# Patient Record
Sex: MALE | Race: ASIAN | NOT HISPANIC OR LATINO | URBAN - METROPOLITAN AREA
[De-identification: names, ages, dates, MRNs, and addresses within clinical notes are randomized per-mention and may not be internally consistent; named-entity substitution may affect disease eponyms.]

---

## 2018-03-20 ENCOUNTER — OFFICE VISIT (OUTPATIENT)
Dept: INTERNAL MEDICINE | Age: 38
End: 2018-03-20

## 2018-03-20 VITALS
HEIGHT: 68 IN | DIASTOLIC BLOOD PRESSURE: 74 MMHG | HEART RATE: 80 BPM | OXYGEN SATURATION: 98 % | TEMPERATURE: 99.1 F | SYSTOLIC BLOOD PRESSURE: 116 MMHG | WEIGHT: 169 LBS | BODY MASS INDEX: 25.61 KG/M2

## 2018-03-20 DIAGNOSIS — E03.9 HYPOTHYROIDISM, UNSPECIFIED TYPE: Primary | Chronic | ICD-10-CM

## 2018-03-20 DIAGNOSIS — E55.9 VITAMIN D DEFICIENCY: ICD-10-CM

## 2018-03-20 DIAGNOSIS — R10.30 LOWER ABDOMINAL PAIN: ICD-10-CM

## 2018-03-20 DIAGNOSIS — E01.0 THYROMEGALY: ICD-10-CM

## 2018-03-20 DIAGNOSIS — E66.3 OVERWEIGHT (BMI 25.0-29.9): Chronic | ICD-10-CM

## 2018-03-20 PROCEDURE — 99204 OFFICE O/P NEW MOD 45 MIN: CPT | Performed by: INTERNAL MEDICINE

## 2018-03-20 NOTE — PROGRESS NOTES
"[facility] [place] INTERNAL MEDICINE  BRAN MARTINEZ M.D.      Brayan Martins / 37 y.o. / male  03/20/2018    VITALS:    Visit Vitals  /74   Pulse 80   Temp 99.1 °F (37.3 °C)   Ht 172.7 cm (68\")   Wt 76.7 kg (169 lb)   SpO2 98%   BMI 25.70 kg/m²       BP Readings from Last 3 Encounters:   03/20/18 116/74     Wt Readings from Last 3 Encounters:   03/20/18 76.7 kg (169 lb)      Body mass index is 25.7 kg/m².    CC: Main reason(s) for today's visit: Establish Care (moved here here [facility]); Abdominal Pain (x 3 months); and Hypothyroidism      HPI:    Patient is a 37 y.o.  [place] male who is here to establish care.  He recently moved from [place] to work for [employer] as a .  Patient has long-standing history of hypothyroidism but stopped taking his thyroid medication about 8 months ago.  Patient apparently ran out of refills on his medication and never got it refilled.  He complains of nonspecific fatigue, has difficulty losing weight.  He has been on \"keto\" diet ×8 months with modest 5 pound weight loss.  He has history of kidney stones in 2008.  Complains of nonspecific vague lower abdominal discomfort which seems to be relieved after bowel movement and which is aggravated by non-keto diet.      Patient Care Team:  Kyrie Martinez MD as PCP - General (Internal Medicine)  ____________________________________________________________________    ASSESSMENT & PLAN:    Problem List Items Addressed This Visit        High    Hypothyroidism - Primary (Chronic)    Current Assessment & Plan     Long-standing history of hypothyroidism but he stopped taking medication about 8 months ago.  Recheck labs including thyroid peroxidase antibody, ultrasound of the thyroid.  Will need to resume medication pending lab results.         Relevant Orders    Lipid Panel With / Chol / HDL Ratio    Comprehensive Metabolic Panel    TSH+Free T4    Thyroid Peroxidase Antibody    US Thyroid       Medium    Lower abdominal pain    " Current Assessment & Plan     Nonspecific lower abdominal discomfort.  Check CBC and urinalysis.  If symptoms continue consider CT of the abdomen and pelvis.  Patient expressed understanding of above plans.         Relevant Orders    Urinalysis With / Microscopic If Indicated - Urine, Clean Catch    CBC & Differential    Thyromegaly    Current Assessment & Plan     Check US thyroid            Low    Overweight (BMI 25.0-29.9) (Chronic)    Current Assessment & Plan     Recommended against keto diet at this time.  Recommended low carbohydrate high-protein diet.  Increase exercise and physical activity.  Will restart thyroid medication which should help with his weight and metabolism.         Relevant Orders    Hemoglobin A1c       Unprioritized    Vitamin D deficiency    Current Assessment & Plan     Check vitamin D level and replace as indicated.         Relevant Orders    Vitamin D 25 Hydroxy      Other Visit Diagnoses    None.          Summary/Discussion:           Return in about 3 months (around 6/20/2018) for Reassess today's problem(s).    Future Appointments  Date Time Provider Department Center   3/21/2018 8:10 AM [facility] None   6/20/2018 2:40 PM MD [facility] None       ____________________________________________________________________        REVIEW OF SYSTEMS    Review of Systems   Constitutional: Negative.         Difficulty losing weight    HENT: Negative.    Eyes: Negative.    Respiratory: Negative.         Snoring    Cardiovascular: Negative.    Gastrointestinal: Positive for abdominal pain. Negative for blood in stool, diarrhea and vomiting. Constipation: mild.   Endocrine: Negative.  Negative for cold intolerance, polydipsia and polyuria.   Genitourinary: Negative.    Musculoskeletal: Negative.    Skin: Negative.    Allergic/Immunologic: Negative.    Neurological: Negative.    Hematological: Negative.    Psychiatric/Behavioral: Negative.          PHYSICAL  EXAMINATION    Physical Exam   Constitutional: He appears well-nourished. No distress.   Centrally overweight    HENT:   Head: Normocephalic.   Right Ear: Tympanic membrane normal.   Left Ear: Tympanic membrane normal.   Mouth/Throat: Oropharynx is clear and moist. No oral lesions.   Eyes: Conjunctivae are normal. Pupils are equal, round, and reactive to light. No scleral icterus.   Neck: Neck supple. No tracheal deviation present. Thyromegaly present.   Cardiovascular: Normal rate, regular rhythm, normal heart sounds and intact distal pulses.  Exam reveals no gallop and no friction rub.    No murmur heard.  Pulmonary/Chest: Effort normal and breath sounds normal.   Abdominal: Soft. Bowel sounds are normal. He exhibits no distension and no mass. There is tenderness (mild lower abdomen). There is no rebound and no guarding. No hernia.   Central obesity   Musculoskeletal: He exhibits no edema or deformity.   Lymphadenopathy:     He has no cervical adenopathy.        Right: No supraclavicular adenopathy present.        Left: No supraclavicular adenopathy present.   Neurological: He is alert. He has normal reflexes.   Skin: Skin is intact. No rash noted. No erythema. No pallor.   No jaundice   Psychiatric: He has a normal mood and affect. His behavior is normal. Judgment and thought content normal. Cognition and memory are normal.       REVIEWED DATA:    Labs:   No results found for: NA, K, AST, ALT, BUN, CREATININE, EGFRIFNONA, EGFRIFAFRI    No results found for: GLUCOSE, HGBA1C, MICROALBUR    No results found for: LDL, HDL, TRIG, CHOLHDLRATIO    No results found for: TSH, FREET4     No results found for: WBC, HGB, PLT      Imaging:        Medical Tests:        Summary of old records / correspondence / consultant report:        Request outside records:        ______________________________________________________________________    ALLERGIES  No Known Allergies     MEDICATIONS  No current outpatient prescriptions on  file.     No current facility-administered medications for this visit.        PFSH:     The following portions of the patient's history were reviewed and updated as appropriate: Allergies / Current Medications / Past Medical History / Surgical History / Social History / Family History    PROBLEM LIST   Patient Active Problem List   Diagnosis   • Hypothyroidism   • Overweight (BMI 25.0-29.9)   • Lower abdominal pain   • Vitamin D deficiency   • Thyromegaly       PAST MEDICAL HISTORY  Past Medical History:   Diagnosis Date   • Hypothyroidism    • Kidney stone 2008    passed       SURGICAL HISTORY  Past Surgical History:   Procedure Laterality Date   • NO PAST SURGERIES         SOCIAL HISTORY  Social History     Social History   • Marital status:    • Number of children: 2     Occupational History   •  ([employer])      Social History Main Topics   • Smoking status: Never Smoker   • Smokeless tobacco: Never Used   • Alcohol use Yes      Comment: occ   • Drug use: Unknown   • Sexual activity: Yes     Partners: Female     Birth control/ protection: Surgical     Other Topics Concern   • Not on file       FAMILY HISTORY  Family History   Problem Relation Age of Onset   • Hypertension Father    • Coronary artery disease Father 50     s/p cabg   • No Known Problems Mother    • No Known Problems Sister    • No Known Problems Maternal Grandmother    • No Known Problems Maternal Grandfather    • No Known Problems Paternal Grandmother    • Hypertension Paternal Grandfather    • Coronary artery disease Paternal Aunt    • Diabetes Neg Hx    • Thyroid disease Neg Hx          **Dragon Disclaimer:   Much of this encounter note is an electronic transcription/translation of spoken language to printed text. The electronic translation of spoken language may permit erroneous, or at times, nonsensical words or phrases to be inadvertently transcribed. Although I have reviewed the note for such errors, some may still exist.

## 2018-03-23 ENCOUNTER — TELEPHONE (OUTPATIENT)
Dept: INTERNAL MEDICINE | Age: 38
End: 2018-03-23

## 2018-03-23 DIAGNOSIS — E03.9 HYPOTHYROIDISM, UNSPECIFIED TYPE: Primary | Chronic | ICD-10-CM

## 2018-03-23 LAB
25(OH)D3+25(OH)D2 SERPL-MCNC: 26.1 NG/ML (ref 30–100)
ALBUMIN SERPL-MCNC: 4.8 G/DL (ref 3.5–5.2)
ALBUMIN/GLOB SERPL: 1.8 G/DL
ALP SERPL-CCNC: 76 U/L (ref 39–117)
ALT SERPL-CCNC: 15 U/L (ref 1–41)
APPEARANCE UR: CLEAR
AST SERPL-CCNC: 17 U/L (ref 1–40)
BASOPHILS # BLD AUTO: 0.01 10*3/MM3 (ref 0–0.2)
BASOPHILS NFR BLD AUTO: 0.2 % (ref 0–1.5)
BILIRUB SERPL-MCNC: 0.9 MG/DL (ref 0.1–1.2)
BILIRUB UR QL STRIP: NEGATIVE
BUN SERPL-MCNC: 10 MG/DL (ref 6–20)
BUN/CREAT SERPL: 9.6 (ref 7–25)
CALCIUM SERPL-MCNC: 10.2 MG/DL (ref 8.6–10.5)
CHLORIDE SERPL-SCNC: 105 MMOL/L (ref 98–107)
CHOLEST SERPL-MCNC: 155 MG/DL (ref 0–200)
CHOLEST/HDLC SERPL: 3.97 {RATIO}
CO2 SERPL-SCNC: 27.8 MMOL/L (ref 22–29)
COLOR UR: YELLOW
CREAT SERPL-MCNC: 1.04 MG/DL (ref 0.76–1.27)
EOSINOPHIL # BLD AUTO: 0.09 10*3/MM3 (ref 0–0.7)
EOSINOPHIL NFR BLD AUTO: 1.9 % (ref 0.3–6.2)
ERYTHROCYTE [DISTWIDTH] IN BLOOD BY AUTOMATED COUNT: 12.8 % (ref 11.5–14.5)
GFR SERPLBLD CREATININE-BSD FMLA CKD-EPI: 80 ML/MIN/1.73
GFR SERPLBLD CREATININE-BSD FMLA CKD-EPI: 96 ML/MIN/1.73
GLOBULIN SER CALC-MCNC: 2.6 GM/DL
GLUCOSE SERPL-MCNC: 98 MG/DL (ref 65–99)
GLUCOSE UR QL: NEGATIVE
HBA1C MFR BLD: 4.97 % (ref 4.8–5.6)
HCT VFR BLD AUTO: 43.5 % (ref 40.4–52.2)
HDLC SERPL-MCNC: 39 MG/DL (ref 40–60)
HGB BLD-MCNC: 14.2 G/DL (ref 13.7–17.6)
HGB UR QL STRIP: NEGATIVE
IMM GRANULOCYTES # BLD: 0 10*3/MM3 (ref 0–0.03)
IMM GRANULOCYTES NFR BLD: 0 % (ref 0–0.5)
KETONES UR QL STRIP: NEGATIVE
LDLC SERPL CALC-MCNC: 102 MG/DL (ref 0–100)
LEUKOCYTE ESTERASE UR QL STRIP: NEGATIVE
LYMPHOCYTES # BLD AUTO: 2.01 10*3/MM3 (ref 0.9–4.8)
LYMPHOCYTES NFR BLD AUTO: 43.4 % (ref 19.6–45.3)
MCH RBC QN AUTO: 30.4 PG (ref 27–32.7)
MCHC RBC AUTO-ENTMCNC: 32.6 G/DL (ref 32.6–36.4)
MCV RBC AUTO: 93.1 FL (ref 79.8–96.2)
MONOCYTES # BLD AUTO: 0.29 10*3/MM3 (ref 0.2–1.2)
MONOCYTES NFR BLD AUTO: 6.3 % (ref 5–12)
NEUTROPHILS # BLD AUTO: 2.23 10*3/MM3 (ref 1.9–8.1)
NEUTROPHILS NFR BLD AUTO: 48.2 % (ref 42.7–76)
NITRITE UR QL STRIP: NEGATIVE
PH UR STRIP: 5.5 [PH] (ref 5–8)
PLATELET # BLD AUTO: 188 10*3/MM3 (ref 140–500)
POTASSIUM SERPL-SCNC: 4.4 MMOL/L (ref 3.5–5.2)
PROT SERPL-MCNC: 7.4 G/DL (ref 6–8.5)
PROT UR QL STRIP: NEGATIVE
RBC # BLD AUTO: 4.67 10*6/MM3 (ref 4.6–6)
SODIUM SERPL-SCNC: 145 MMOL/L (ref 136–145)
SP GR UR: 1.02 (ref 1–1.03)
T4 FREE SERPL-MCNC: 1.01 NG/DL (ref 0.93–1.7)
THYROPEROXIDASE AB SERPL-ACNC: 24 IU/ML (ref 0–34)
TRIGL SERPL-MCNC: 71 MG/DL (ref 0–150)
TSH SERPL DL<=0.005 MIU/L-ACNC: 9.39 MIU/ML (ref 0.27–4.2)
UROBILINOGEN UR STRIP-MCNC: NORMAL MG/DL
VLDLC SERPL CALC-MCNC: 14.2 MG/DL (ref 5–40)
WBC # BLD AUTO: 4.63 10*3/MM3 (ref 4.5–10.7)

## 2018-03-23 RX ORDER — LEVOTHYROXINE SODIUM 0.05 MG/1
50 TABLET ORAL DAILY
Qty: 30 TABLET | Refills: 3 | Status: SHIPPED | OUTPATIENT
Start: 2018-03-23 | End: 2018-03-26 | Stop reason: SDUPTHER

## 2018-03-23 NOTE — PROGRESS NOTES
Call patient with his test result(s) and mail the results to him if MyChart is NOT active.    Thyroid is low:  Start levothyroxine 50 mcg qAM. Recheck labs in 2 months.   Blood sugar is fine.  Cholesterol is okay overall.     (REMINDER: Update med list, maintain dx association, and update change on CURRENT ASSESSMENT/PLAN)

## 2018-03-23 NOTE — TELEPHONE ENCOUNTER
----- Message from Kyrie Ayala MD sent at 3/23/2018  9:33 AM EDT -----  Call patient with his test result(s) and mail the results to him if MyChart is NOT active.    Thyroid is low:  Start levothyroxine 50 mcg qAM. Recheck labs in 2 months.   Blood sugar is fine.  Cholesterol is okay overall.     (REMINDER: Update med list, maintain dx association, and update change on CURRENT ASSESSMENT/PLAN)

## 2018-03-23 NOTE — ASSESSMENT & PLAN NOTE
Pt to start levothyroxine 50 mcg in the AM and to recheck thyroid labs in 2 months. IVANIA per Dr Ayala

## 2018-03-26 RX ORDER — LEVOTHYROXINE SODIUM 0.05 MG/1
50 TABLET ORAL DAILY
Qty: 30 TABLET | Refills: 3 | Status: SHIPPED | OUTPATIENT
Start: 2018-03-26 | End: 2018-08-31 | Stop reason: SDUPTHER

## 2018-05-22 DIAGNOSIS — E03.9 HYPOTHYROIDISM, UNSPECIFIED TYPE: Primary | Chronic | ICD-10-CM

## 2018-05-23 LAB
T4 FREE SERPL-MCNC: 1.38 NG/DL (ref 0.93–1.7)
TSH SERPL DL<=0.005 MIU/L-ACNC: 2.51 MIU/ML (ref 0.27–4.2)

## 2018-05-24 ENCOUNTER — TELEPHONE (OUTPATIENT)
Dept: INTERNAL MEDICINE | Age: 38
End: 2018-05-24

## 2018-06-20 ENCOUNTER — OFFICE VISIT (OUTPATIENT)
Dept: INTERNAL MEDICINE | Age: 38
End: 2018-06-20

## 2018-06-20 VITALS
TEMPERATURE: 97.8 F | BODY MASS INDEX: 25.01 KG/M2 | HEART RATE: 64 BPM | OXYGEN SATURATION: 98 % | DIASTOLIC BLOOD PRESSURE: 64 MMHG | WEIGHT: 165 LBS | HEIGHT: 68 IN | SYSTOLIC BLOOD PRESSURE: 110 MMHG

## 2018-06-20 DIAGNOSIS — E01.0 THYROMEGALY: ICD-10-CM

## 2018-06-20 DIAGNOSIS — E03.9 HYPOTHYROIDISM, UNSPECIFIED TYPE: Primary | Chronic | ICD-10-CM

## 2018-06-20 PROCEDURE — 99213 OFFICE O/P EST LOW 20 MIN: CPT | Performed by: INTERNAL MEDICINE

## 2018-06-20 NOTE — PROGRESS NOTES
"[facility] [place] INTERNAL MEDICINE  BRAN MARTINEZ M.D.      Brayan Gonzalezali / 38 y.o. / male  06/20/2018      ASSESSMENT & PLAN:    Problem List Items Addressed This Visit        High    Hypothyroidism - Primary (Chronic)    Current Assessment & Plan     Recheck TSH and Free T4 end of July. Continue levothyroxine 50 mcg for now.   Recommended proceeding with US of thyroid.          Relevant Medications    levothyroxine (SYNTHROID) 50 MCG tablet    Other Relevant Orders    TSH+Free T4       Medium    Thyromegaly    Current Assessment & Plan     Did not have US done yet. May have it done in [place].          Relevant Medications    levothyroxine (SYNTHROID) 50 MCG tablet        Orders Placed This Encounter   Procedures   • TSH+Free T4     No orders of the defined types were placed in this encounter.      Summary/Discussion:      Return in about 4 months (around 10/20/2018) for Hypothyroidism.    ____________________________________________________________________    MEDICATIONS  Current Outpatient Prescriptions   Medication Sig Dispense Refill   • levothyroxine (SYNTHROID) 50 MCG tablet Take 1 tablet by mouth Daily. 30 tablet 3     No current facility-administered medications for this visit.          VITALS    Visit Vitals  /64   Pulse 64   Temp 97.8 °F (36.6 °C)   Ht 172.7 cm (67.99\")   Wt 74.8 kg (165 lb)   SpO2 98%   BMI 25.09 kg/m²       BP Readings from Last 3 Encounters:   06/20/18 110/64   03/20/18 116/74     Wt Readings from Last 3 Encounters:   06/20/18 74.8 kg (165 lb)   03/20/18 76.7 kg (169 lb)      Body mass index is 25.09 kg/m².    CC:  Main reason(s) for today's visit: Hypothyroidism      HPI:     Feels better since restarting levothyroxine. On 50 mcg daily. Abdominal discomfort has resolved.   Did not have US performed for evaluation of enlarged thyroid. Denies family history of thyroid cancer.     Patient Care Team:  Kyrie Martinez MD as PCP - General (Internal " Medicine)  ____________________________________________________________________    REVIEW OF SYSTEMS    Review of Systems  Constitutional neg  Resp neg  CV neg     PHYSICAL EXAMINATION    Physical Exam  Alert, normal thought and judgment    REVIEWED DATA:    Labs:     Lab Results   Component Value Date     03/22/2018    K 4.4 03/22/2018    AST 17 03/22/2018    ALT 15 03/22/2018    BUN 10 03/22/2018    CREATININE 1.04 03/22/2018    EGFRIFNONA 80 03/22/2018    EGFRIFAFRI 96 03/22/2018       Lab Results   Component Value Date    HGBA1C 4.97 03/22/2018       Lab Results   Component Value Date     (H) 03/22/2018    HDL 39 (L) 03/22/2018    TRIG 71 03/22/2018    CHOLHDLRATIO 3.97 03/22/2018       Lab Results   Component Value Date    TSH 2.510 05/23/2018    FREET4 1.38 05/23/2018       Lab Results   Component Value Date    WBC 4.63 03/22/2018    HGB 14.2 03/22/2018     03/22/2018        Imaging:         Medical Tests:         Summary of old records / correspondence / consultant report:         Request outside records:         ALLERGIES  No Known Allergies     PFSH:     The following portions of the patient's history were reviewed and updated as appropriate: Allergies / Current Medications / Past Medical History / Surgical History / Social History / Family History    PROBLEM LIST   Patient Active Problem List   Diagnosis   • Hypothyroidism   • Overweight (BMI 25.0-29.9)   • Vitamin D deficiency   • Thyromegaly       PAST MEDICAL HISTORY  Past Medical History:   Diagnosis Date   • Hypothyroidism    • Kidney stone 2008    passed       SURGICAL HISTORY  Past Surgical History:   Procedure Laterality Date   • NO PAST SURGERIES         SOCIAL HISTORY  Social History     Social History   • Marital status:    • Number of children: 2     Occupational History   •  ([employer])      Social History Main Topics   • Smoking status: Never Smoker   • Smokeless tobacco: Never Used   • Alcohol use Yes       Comment: occ   • Drug use: Unknown   • Sexual activity: Yes     Partners: Female     Birth control/ protection: Surgical     Other Topics Concern   • Not on file       FAMILY HISTORY  Family History   Problem Relation Age of Onset   • Hypertension Father    • Coronary artery disease Father 50        s/p cabg   • No Known Problems Mother    • No Known Problems Sister    • No Known Problems Maternal Grandmother    • No Known Problems Maternal Grandfather    • No Known Problems Paternal Grandmother    • Hypertension Paternal Grandfather    • Coronary artery disease Paternal Aunt    • Diabetes Neg Hx    • Thyroid disease Neg Hx            **Dragon Disclaimer:   Much of this encounter note is an electronic transcription/translation of spoken language to printed text. The electronic translation of spoken language may permit erroneous, or at times, nonsensical words or phrases to be inadvertently transcribed. Although I have reviewed the note for such errors, some may still exist.

## 2018-06-20 NOTE — ASSESSMENT & PLAN NOTE
Recheck TSH and Free T4 end of July. Continue levothyroxine 50 mcg for now.   Recommended proceeding with US of thyroid.

## 2018-07-30 ENCOUNTER — RESULTS ENCOUNTER (OUTPATIENT)
Dept: INTERNAL MEDICINE | Age: 38
End: 2018-07-30

## 2018-07-30 DIAGNOSIS — E03.9 HYPOTHYROIDISM, UNSPECIFIED TYPE: Chronic | ICD-10-CM

## 2018-07-30 LAB
T4 FREE SERPL-MCNC: 1.47 NG/DL (ref 0.93–1.7)
TSH SERPL DL<=0.005 MIU/L-ACNC: 2.55 MIU/ML (ref 0.27–4.2)

## 2018-08-30 ENCOUNTER — PATIENT MESSAGE (OUTPATIENT)
Dept: INTERNAL MEDICINE | Age: 38
End: 2018-08-30

## 2018-08-31 ENCOUNTER — TELEPHONE (OUTPATIENT)
Dept: INTERNAL MEDICINE | Age: 38
End: 2018-08-31

## 2018-08-31 DIAGNOSIS — E03.9 HYPOTHYROIDISM, UNSPECIFIED TYPE: Primary | Chronic | ICD-10-CM

## 2018-08-31 RX ORDER — LEVOTHYROXINE SODIUM 0.05 MG/1
50 TABLET ORAL DAILY
Qty: 90 TABLET | Refills: 1 | Status: SHIPPED | OUTPATIENT
Start: 2018-08-31 | End: 2019-04-08 | Stop reason: SDUPTHER

## 2019-03-24 DIAGNOSIS — E03.9 HYPOTHYROIDISM, UNSPECIFIED TYPE: Chronic | ICD-10-CM

## 2019-03-26 RX ORDER — LEVOTHYROXINE SODIUM 0.05 MG/1
50 TABLET ORAL DAILY
Qty: 90 TABLET | Refills: 1 | OUTPATIENT
Start: 2019-03-26

## 2019-04-05 ENCOUNTER — OFFICE VISIT (OUTPATIENT)
Dept: INTERNAL MEDICINE | Age: 39
End: 2019-04-05

## 2019-04-05 VITALS
DIASTOLIC BLOOD PRESSURE: 68 MMHG | HEART RATE: 72 BPM | HEIGHT: 68 IN | OXYGEN SATURATION: 98 % | TEMPERATURE: 98.3 F | BODY MASS INDEX: 25.61 KG/M2 | SYSTOLIC BLOOD PRESSURE: 112 MMHG | WEIGHT: 169 LBS

## 2019-04-05 DIAGNOSIS — E66.3 OVERWEIGHT (BMI 25.0-29.9): Chronic | ICD-10-CM

## 2019-04-05 DIAGNOSIS — E03.9 HYPOTHYROIDISM, UNSPECIFIED TYPE: Primary | Chronic | ICD-10-CM

## 2019-04-05 LAB
T4 FREE SERPL-MCNC: 1.34 NG/DL (ref 0.93–1.7)
TSH SERPL DL<=0.005 MIU/L-ACNC: 3.45 MIU/ML (ref 0.27–4.2)

## 2019-04-05 PROCEDURE — 99213 OFFICE O/P EST LOW 20 MIN: CPT | Performed by: INTERNAL MEDICINE

## 2019-04-05 NOTE — PROGRESS NOTES
"[facility] [place] INTERNAL MEDICINE  BRAN MARTINEZ M.D.      Brayan Eliezer / 38 y.o. / male  04/05/2019      ASSESSMENT & PLAN:    Problem List Items Addressed This Visit        High    Hypothyroidism - Primary (Chronic)    Current Assessment & Plan     Check TSH and Free T4. Titrate dose as needed.   Continue levothyroxine 50 mcg.          Relevant Medications    levothyroxine (SYNTHROID) 50 MCG tablet    Other Relevant Orders    TSH+Free T4       Low    Overweight (BMI 25.0-29.9) (Chronic)    Current Assessment & Plan     Discussed diet. Advised to get a [employer] for work to help with his core conditioning.              Orders Placed This Encounter   Procedures   • TSH+Free T4     No orders of the defined types were placed in this encounter.      Summary/Discussion:  ·     Return in about 5 months (around 9/5/2019) for Annual physical.    ____________________________________________________________________    MEDICATIONS  Current Outpatient Medications   Medication Sig Dispense Refill   • levothyroxine (SYNTHROID) 50 MCG tablet Take 1 tablet by mouth Daily. 90 tablet 1     No current facility-administered medications for this visit.           VITALS:    Visit Vitals  /68   Pulse 72   Temp 98.3 °F (36.8 °C)   Ht 172.7 cm (67.99\")   Wt 76.7 kg (169 lb)   SpO2 98%   BMI 25.70 kg/m²       BP Readings from Last 3 Encounters:   04/05/19 112/68   06/20/18 110/64   03/20/18 116/74     Wt Readings from Last 3 Encounters:   04/05/19 76.7 kg (169 lb)   06/20/18 74.8 kg (165 lb)   03/20/18 76.7 kg (169 lb)      Body mass index is 25.7 kg/m².    CC:  Main reason(s) for today's visit: Hypothyroidism      HPI:     Chronic hypothyroidism:  On stable dose of levothyroxine, weight gain  Lab Results   Component Value Date    TSH 2.550 07/30/2018    TSH 2.510 05/23/2018    FREET4 1.47 07/30/2018    FREET4 1.38 05/23/2018     Overweight. Gained 4 lbs. Complains of central weight gain. Sits for 6 hours of 8 hours at work daily.    Patient Care " Team:  Kyrie Ayala MD as PCP - General (Internal Medicine)    ____________________________________________________________________    REVIEW OF SYSTEMS    Review of Systems  Constitutional mild weight gain   Resp neg  CV neg  GI neg       PHYSICAL EXAMINATION    Physical Exam  Constitutional  No distress  Neck: No mass, thyroid nodule, thyromegaly or cervical LAD   Abdominal: mild central obesity   Psychiatric  Alert. Judgment and thought content normal. Mood normal    REVIEWED DATA:    Labs:     Lab Results   Component Value Date     03/22/2018    K 4.4 03/22/2018    AST 17 03/22/2018    ALT 15 03/22/2018    BUN 10 03/22/2018    CREATININE 1.04 03/22/2018    EGFRIFNONA 80 03/22/2018    EGFRIFAFRI 96 03/22/2018       Lab Results   Component Value Date    HGBA1C 4.97 03/22/2018       Lab Results   Component Value Date     (H) 03/22/2018    HDL 39 (L) 03/22/2018    TRIG 71 03/22/2018    CHOLHDLRATIO 3.97 03/22/2018       Lab Results   Component Value Date    TSH 2.550 07/30/2018    FREET4 1.47 07/30/2018       Lab Results   Component Value Date    WBC 4.63 03/22/2018    HGB 14.2 03/22/2018     03/22/2018       Lab Results   Component Value Date    PROTEIN Negative 03/22/2018    GLUCOSEU Negative 03/22/2018    BLOODU Negative 03/22/2018    NITRITEU Negative 03/22/2018    LEUKOCYTESUR Negative 03/22/2018       Imaging:         Medical Tests:         Summary of old records / correspondence / consultant report:         Request outside records:         ALLERGIES  No Known Allergies     PFSH:     The following portions of the patient's history were reviewed and updated as appropriate: Allergies / Current Medications / Past Medical History / Surgical History / Social History / Family History    PROBLEM LIST   Patient Active Problem List   Diagnosis   • Hypothyroidism   • Overweight (BMI 25.0-29.9)   • Vitamin D deficiency       PAST MEDICAL HISTORY  Past Medical History:   Diagnosis Date   •  Hypothyroidism    • Kidney stone 2008    passed       SURGICAL HISTORY  Past Surgical History:   Procedure Laterality Date   • NO PAST SURGERIES         SOCIAL HISTORY  Social History     Socioeconomic History   • Marital status:      Spouse name: Not on file   • Number of children: 2   • Years of education: Not on file   • Highest education level: Not on file   Occupational History   • Occupation:  ([employer])   Tobacco Use   • Smoking status: Never Smoker   • Smokeless tobacco: Never Used   Substance and Sexual Activity   • Alcohol use: Yes     Comment: occ   • Sexual activity: Yes     Partners: Female     Birth control/protection: Surgical       FAMILY HISTORY  Family History   Problem Relation Age of Onset   • Hypertension Father    • Coronary artery disease Father 50        s/p cabg   • No Known Problems Mother    • No Known Problems Sister    • No Known Problems Maternal Grandmother    • No Known Problems Maternal Grandfather    • No Known Problems Paternal Grandmother    • Hypertension Paternal Grandfather    • Coronary artery disease Paternal Aunt    • Diabetes Neg Hx    • Thyroid disease Neg Hx          **Dragon Disclaimer:   Much of this encounter note is an electronic transcription/translation of spoken language to printed text. The electronic translation of spoken language may permit erroneous, or at times, nonsensical words or phrases to be inadvertently transcribed. Although I have reviewed the note for such errors, some may still exist.       Template created by Milton Ayala MD

## 2019-04-08 DIAGNOSIS — E03.9 HYPOTHYROIDISM, UNSPECIFIED TYPE: Chronic | ICD-10-CM

## 2019-04-08 RX ORDER — LEVOTHYROXINE SODIUM 0.07 MG/1
75 TABLET ORAL DAILY
Qty: 90 TABLET | Refills: 1 | Status: SHIPPED | OUTPATIENT
Start: 2019-04-08 | End: 2019-09-11 | Stop reason: DRUGHIGH

## 2019-04-08 RX ORDER — LEVOTHYROXINE SODIUM 0.05 MG/1
75 TABLET ORAL DAILY
Qty: 90 TABLET | Refills: 1 | Status: SHIPPED | OUTPATIENT
Start: 2019-04-08 | End: 2019-04-08 | Stop reason: SDUPTHER

## 2019-04-08 NOTE — PROGRESS NOTES
Call patient with results:    Thyroid level is marginal.  Increase levothyroxine to 75 mcg.  Recheck TFT in 6-8 weeks.     (Reminder: Update Current Assessment & Plan)

## 2019-07-13 DIAGNOSIS — E03.9 HYPOTHYROIDISM, UNSPECIFIED TYPE: Chronic | ICD-10-CM

## 2019-07-15 RX ORDER — LEVOTHYROXINE SODIUM 0.05 MG/1
TABLET ORAL
Qty: 90 TABLET | Refills: 2 | Status: SHIPPED | OUTPATIENT
Start: 2019-07-15 | End: 2019-09-11 | Stop reason: SDUPTHER

## 2019-08-29 DIAGNOSIS — Z00.00 PREVENTATIVE HEALTH CARE: Primary | ICD-10-CM

## 2019-08-30 LAB
ALBUMIN SERPL-MCNC: 4.6 G/DL (ref 3.5–5.2)
ALBUMIN/GLOB SERPL: 1.7 G/DL
ALP SERPL-CCNC: 75 U/L (ref 39–117)
ALT SERPL-CCNC: 13 U/L (ref 1–41)
APPEARANCE UR: CLEAR
AST SERPL-CCNC: 15 U/L (ref 1–40)
BASOPHILS # BLD AUTO: 0.01 10*3/MM3 (ref 0–0.2)
BASOPHILS NFR BLD AUTO: 0.2 % (ref 0–1.5)
BILIRUB SERPL-MCNC: 0.8 MG/DL (ref 0.2–1.2)
BILIRUB UR QL STRIP: NEGATIVE
BUN SERPL-MCNC: 10 MG/DL (ref 6–20)
BUN/CREAT SERPL: 10.9 (ref 7–25)
CALCIUM SERPL-MCNC: 9.5 MG/DL (ref 8.6–10.5)
CHLORIDE SERPL-SCNC: 102 MMOL/L (ref 98–107)
CHOLEST SERPL-MCNC: 157 MG/DL (ref 0–200)
CHOLEST/HDLC SERPL: 4.36 {RATIO}
CO2 SERPL-SCNC: 25.8 MMOL/L (ref 22–29)
COLOR UR: YELLOW
CREAT SERPL-MCNC: 0.92 MG/DL (ref 0.76–1.27)
EOSINOPHIL # BLD AUTO: 0.08 10*3/MM3 (ref 0–0.4)
EOSINOPHIL NFR BLD AUTO: 1.8 % (ref 0.3–6.2)
ERYTHROCYTE [DISTWIDTH] IN BLOOD BY AUTOMATED COUNT: 12.4 % (ref 12.3–15.4)
GLOBULIN SER CALC-MCNC: 2.7 GM/DL
GLUCOSE SERPL-MCNC: 105 MG/DL (ref 65–99)
GLUCOSE UR QL: NEGATIVE
HCT VFR BLD AUTO: 42.5 % (ref 37.5–51)
HDLC SERPL-MCNC: 36 MG/DL (ref 40–60)
HGB BLD-MCNC: 14.1 G/DL (ref 13–17.7)
HGB UR QL STRIP: NEGATIVE
IMM GRANULOCYTES # BLD AUTO: 0.01 10*3/MM3 (ref 0–0.05)
IMM GRANULOCYTES NFR BLD AUTO: 0.2 % (ref 0–0.5)
KETONES UR QL STRIP: NEGATIVE
LDLC SERPL CALC-MCNC: 97 MG/DL (ref 0–100)
LEUKOCYTE ESTERASE UR QL STRIP: NEGATIVE
LYMPHOCYTES # BLD AUTO: 1.57 10*3/MM3 (ref 0.7–3.1)
LYMPHOCYTES NFR BLD AUTO: 35.8 % (ref 19.6–45.3)
MCH RBC QN AUTO: 30.1 PG (ref 26.6–33)
MCHC RBC AUTO-ENTMCNC: 33.2 G/DL (ref 31.5–35.7)
MCV RBC AUTO: 90.6 FL (ref 79–97)
MONOCYTES # BLD AUTO: 0.39 10*3/MM3 (ref 0.1–0.9)
MONOCYTES NFR BLD AUTO: 8.9 % (ref 5–12)
NEUTROPHILS # BLD AUTO: 2.33 10*3/MM3 (ref 1.7–7)
NEUTROPHILS NFR BLD AUTO: 53.1 % (ref 42.7–76)
NITRITE UR QL STRIP: NEGATIVE
NRBC BLD AUTO-RTO: 0.2 /100 WBC (ref 0–0.2)
PH UR STRIP: 5.5 [PH] (ref 5–8)
PLATELET # BLD AUTO: 187 10*3/MM3 (ref 140–450)
POTASSIUM SERPL-SCNC: 4.4 MMOL/L (ref 3.5–5.2)
PROT SERPL-MCNC: 7.3 G/DL (ref 6–8.5)
PROT UR QL STRIP: NEGATIVE
PSA SERPL-MCNC: 0.46 NG/ML (ref 0–4)
RBC # BLD AUTO: 4.69 10*6/MM3 (ref 4.14–5.8)
SODIUM SERPL-SCNC: 140 MMOL/L (ref 136–145)
SP GR UR: 1.02 (ref 1–1.03)
TRIGL SERPL-MCNC: 119 MG/DL (ref 0–150)
TSH SERPL DL<=0.005 MIU/L-ACNC: 2.27 UIU/ML (ref 0.27–4.2)
UROBILINOGEN UR STRIP-MCNC: NORMAL MG/DL
VLDLC SERPL CALC-MCNC: 23.8 MG/DL
WBC # BLD AUTO: 4.39 10*3/MM3 (ref 3.4–10.8)

## 2019-09-03 ENCOUNTER — TELEPHONE (OUTPATIENT)
Dept: INTERNAL MEDICINE | Age: 39
End: 2019-09-03

## 2019-09-03 NOTE — TELEPHONE ENCOUNTER
Send as [place] Message:    I see that you have an appointment with me on 9/10.   We can discuss this in detail at that time.     Dr. Ayala

## 2019-09-03 NOTE — TELEPHONE ENCOUNTER
Regarding: Test Results Question  Contact: [phone]  ----- Message from Vindicia, Generic sent at 9/3/2019 12:41 PM EDT -----    I have a question about COMPREHENSIVE METABOLIC PANEL resulted on 8/30/19, 5:07 PM.    I see blood glucose is above the range .Do I need to take preventive /corrective action     Thanks   Brayan

## 2019-09-04 NOTE — TELEPHONE ENCOUNTER
Let him know this will be discussed on the office visit.   Maintain a low sugar/starch/carbohydrate diet and exercise regularly.

## 2019-09-04 NOTE — TELEPHONE ENCOUNTER
The pt had to be moved to the next day, as this was scheduled as OV and should have been CPE.     He still wants feedback on his labs prior to CPE.    Please advise.    KD

## 2019-09-11 ENCOUNTER — OFFICE VISIT (OUTPATIENT)
Dept: INTERNAL MEDICINE | Age: 39
End: 2019-09-11

## 2019-09-11 VITALS
HEART RATE: 91 BPM | BODY MASS INDEX: 26.04 KG/M2 | WEIGHT: 171.8 LBS | TEMPERATURE: 97.3 F | DIASTOLIC BLOOD PRESSURE: 78 MMHG | HEIGHT: 68 IN | SYSTOLIC BLOOD PRESSURE: 122 MMHG | OXYGEN SATURATION: 98 %

## 2019-09-11 DIAGNOSIS — E03.9 HYPOTHYROIDISM, UNSPECIFIED TYPE: Chronic | ICD-10-CM

## 2019-09-11 DIAGNOSIS — E66.3 OVERWEIGHT (BMI 25.0-29.9): Chronic | ICD-10-CM

## 2019-09-11 DIAGNOSIS — R73.01 IFG (IMPAIRED FASTING GLUCOSE): ICD-10-CM

## 2019-09-11 DIAGNOSIS — Z00.00 ENCOUNTER FOR ANNUAL HEALTH EXAMINATION: Primary | ICD-10-CM

## 2019-09-11 LAB — HBA1C MFR BLD: 5.5 %

## 2019-09-11 PROCEDURE — 99213 OFFICE O/P EST LOW 20 MIN: CPT | Performed by: INTERNAL MEDICINE

## 2019-09-11 PROCEDURE — 83036 HEMOGLOBIN GLYCOSYLATED A1C: CPT | Performed by: INTERNAL MEDICINE

## 2019-09-11 PROCEDURE — 99396 PREV VISIT EST AGE 40-64: CPT | Performed by: INTERNAL MEDICINE

## 2019-09-11 PROCEDURE — 90715 TDAP VACCINE 7 YRS/> IM: CPT | Performed by: INTERNAL MEDICINE

## 2019-09-11 PROCEDURE — 90471 IMMUNIZATION ADMIN: CPT | Performed by: INTERNAL MEDICINE

## 2019-09-11 RX ORDER — LEVOTHYROXINE SODIUM 0.07 MG/1
75 TABLET ORAL EVERY MORNING
Qty: 30 TABLET | Refills: 3 | Status: SHIPPED | OUTPATIENT
Start: 2019-09-11 | End: 2020-01-14 | Stop reason: SDUPTHER

## 2019-09-11 NOTE — ASSESSMENT & PLAN NOTE
· The following were discussed: low calorie, low carb based diet program and make dinner lightest meal of day   · Stop eating Czech bread for supper.

## 2019-09-11 NOTE — PROGRESS NOTES
[facility] [place] [facility]  BRAN MARTINEZ M.D.      Brayan Eliezer / 39 y.o. / male  09/11/2019    CHIEF COMPLAINT     Annual Exam      HISTORY OF PRESENT ILLNESS      Reviewed chief complaint and details of complaint as documented by staff.     Brayan presents for annual health maintenance visit.  Main concern is about inability to affect weight loss with lifestyle changes and taking thyroid medication.     · Last health maintenance visit: unsure  · General health: fair  · Lifestyle:  · Attempting to lose weight?: Yes   · Diet: eats decently and eats heavier dinner  · Exercise: exercises nearly every day and sedentary at work   · Tobacco: Never used   · Alcohol: does not drink  · Work: Full-time  · Reproductive health:  · Sexually active?: Yes   · Concern for STD?: No   · Sexual problems?: No problems   · Sees Urologist?: No   · Depression Screening:      PHQ-2/PHQ-9 Depression Screening 9/11/2019   Little interest or pleasure in doing things 0   Feeling down, depressed, or hopeless 0   Total Score 0         PHQ-2: 0 (Not depressed)     PHQ-9:     Patient Care Team:  Kyrie Martinez MD as PCP - General (Internal Medicine)  ______________________________________________________________________    ALLERGIES  No Known Allergies     MEDICATIONS  Current Outpatient Medications   Medication Sig Dispense Refill   • levothyroxine (SYNTHROID, LEVOTHROID) 50 MCG tablet TAKE 1 TABLET BY MOUTH EVERY DAY 90 tablet 2     No current facility-administered medications for this visit.        PFSH:     The following portions of the patient's history were reviewed and updated as appropriate: Allergies / Current Medications / Past Medical History / Surgical History / Social History / Family History    PROBLEM LIST   Patient Active Problem List   Diagnosis   • Hypothyroidism   • Overweight (BMI 25.0-29.9)   • Vitamin D deficiency   • IFG (impaired fasting glucose)       PAST MEDICAL HISTORY  Past Medical History:   Diagnosis Date   • Hypothyroidism     • Kidney stone 2008    passed       SURGICAL HISTORY  Past Surgical History:   Procedure Laterality Date   • CIRCUMCISION  2009       SOCIAL HISTORY  Social History     Socioeconomic History   • Marital status:      Spouse name: Mikki   • Number of children: 2   • Years of education: Not on file   • Highest education level: Not on file   Occupational History   • Occupation:  ([employer])   Tobacco Use   • Smoking status: Never Smoker   • Smokeless tobacco: Never Used   Substance and Sexual Activity   • Alcohol use: Yes     Comment: occ   • Drug use: No   • Sexual activity: Yes     Partners: Female     Birth control/protection: Surgical   Lifestyle   • Physical activity:     Days per week: 7 days     Minutes per session: Not on file   • Stress: To some extent       FAMILY HISTORY  Family History   Problem Relation Age of Onset   • Hypertension Father    • Coronary artery disease Father 50        s/p cabg   • No Known Problems Mother    • No Known Problems Sister    • No Known Problems Maternal Grandmother    • No Known Problems Maternal Grandfather    • No Known Problems Paternal Grandmother    • Hypertension Paternal Grandfather    • Coronary artery disease Paternal Aunt    • Diabetes Neg Hx    • Thyroid disease Neg Hx    • Cancer Neg Hx        IMMUNIZATION HISTORY  Immunization History   Administered Date(s) Administered   • Influenza, Unspecified 02/24/2019   • Tdap 09/11/2019       ______________________________________________________________________    REVIEW OF SYSTEMS     Review of Systems   Constitutional: Positive for fatigue. Negative for unexpected weight change.   HENT: Negative.    Eyes: Negative.    Respiratory: Negative.    Cardiovascular: Negative.    Gastrointestinal: Negative.    Endocrine: Negative.    Genitourinary: Negative.    Musculoskeletal: Negative.    Skin: Negative.    Allergic/Immunologic: Negative.    Neurological: Negative.    Hematological: Negative.   "  Psychiatric/Behavioral: Negative.          VITALS     Visit Vitals  /78   Pulse 91   Temp 97.3 °F (36.3 °C)   Ht 172.7 cm (67.99\")   Wt 77.9 kg (171 lb 12.8 oz)   SpO2 98%   BMI 26.13 kg/m²       BP Readings from Last 3 Encounters:   09/11/19 122/78   04/05/19 112/68   06/20/18 110/64     Wt Readings from Last 3 Encounters:   09/11/19 77.9 kg (171 lb 12.8 oz)   04/05/19 76.7 kg (169 lb)   06/20/18 74.8 kg (165 lb)      Body mass index is 26.13 kg/m².    PHYSICAL EXAMINATION     Physical Exam   Constitutional: He is oriented to person, place, and time. He appears well-developed and well-nourished. No distress.   HENT:   Head: Normocephalic and atraumatic.   Right Ear: External ear normal.   Left Ear: External ear normal.   Nose: Nose normal.   Mouth/Throat: Oropharynx is clear and moist.   Eyes: Conjunctivae and EOM are normal. Pupils are equal, round, and reactive to light. No scleral icterus.   Neck: Normal range of motion. Neck supple. No tracheal deviation present. No thyroid mass and no thyromegaly present.   Cardiovascular: Normal rate, regular rhythm, normal heart sounds and intact distal pulses.   Pulmonary/Chest: Effort normal and breath sounds normal.   Abdominal: Soft. Normal appearance and bowel sounds are normal. He exhibits no distension and no mass. There is no hepatosplenomegaly. There is no tenderness. No hernia.   Musculoskeletal: Normal range of motion.   Lymphadenopathy:     He has no cervical adenopathy.     He has no axillary adenopathy.        Right: No inguinal and no supraclavicular adenopathy present.        Left: No inguinal and no supraclavicular adenopathy present.   Neurological: He is alert and oriented to person, place, and time. He has normal reflexes. No cranial nerve deficit. He exhibits normal muscle tone.   Skin: Skin is warm. No lesion (Negative for suspicious skin lesions/growths) and no rash noted. No pallor.   No jaundice  No suspicious skin lesions.    Psychiatric: He " has a normal mood and affect. His behavior is normal. Judgment and thought content normal.         REVIEWED DATA      Labs:    Lab Results   Component Value Date     08/30/2019    K 4.4 08/30/2019    CALCIUM 9.5 08/30/2019    AST 15 08/30/2019    ALT 13 08/30/2019    BUN 10 08/30/2019    CREATININE 0.92 08/30/2019    CREATININE 1.04 03/22/2018    EGFRIFNONA 91 08/30/2019    EGFRIFAFRI 110 08/30/2019       Lab Results   Component Value Date     (H) 08/30/2019    HGBA1C 5.5 09/11/2019    HGBA1C 4.97 03/22/2018    TSH 2.270 08/30/2019    FREET4 1.34 04/05/2019       Lab Results   Component Value Date    PSA 0.459 08/30/2019       Lab Results   Component Value Date    LDL 97 08/30/2019    HDL 36 (L) 08/30/2019    TRIG 119 08/30/2019    CHOLHDLRATIO 4.36 08/30/2019       No components found for: [id number]    Lab Results   Component Value Date    WBC 4.39 08/30/2019    HGB 14.1 08/30/2019    MCV 90.6 08/30/2019     08/30/2019       Lab Results   Component Value Date    PROTEIN Negative 08/30/2019    GLUCOSEU Negative 08/30/2019    BLOODU Negative 08/30/2019    NITRITEU Negative 08/30/2019    LEUKOCYTESUR Negative 08/30/2019        No results found for: HEPCVIRUSABY    Imaging:           Medical Tests:       ______________________________________________________________________    ASSESSMENT & PLAN     ANNUAL WELLNESS EXAM / PHYSICAL     Other medical problems addressed today:  Problem List Items Addressed This Visit        High    Hypothyroidism (Chronic)    Current Assessment & Plan     Increase levothyroxine to 75 mcg. Recheck TSH and Free T4 on follow-up.          Relevant Medications    levothyroxine (SYNTHROID, LEVOTHROID) 75 MCG tablet       Low    Overweight (BMI 25.0-29.9) (Chronic)    Current Assessment & Plan     · The following were discussed: low calorie, low carb based diet program and make dinner lightest meal of day   · Stop eating  bread for supper.             IFG (impaired  fasting glucose)    Current Assessment & Plan     This is a new problem to this examiner.   Fasting sugar 105. A1c is little higher.   Maintain a low sugar/starch/carbohydrate diet.     Lab Results   Component Value Date    HGBA1C 5.5 09/11/2019    HGBA1C 4.97 03/22/2018               Relevant Orders    POC Glycosylated Hemoglobin (Hb A1C) (Completed)      Other Visit Diagnoses     Encounter for annual health examination    -  Primary    Relevant Orders    Tdap Vaccine Greater Than or Equal To 6yo IM (Completed)          Summary/Discussion:     · Primary reason for today's visit was for annual health examination. However above active medical issues also needed to be addressed today.      Next Appointment with me: 1/13/2020    Return in about 4 months (around 1/11/2020) for Hypothyroidism and wt concerns.      HEALTHCARE MAINTENANCE ISSUES       Cancer Screening:  · Colon: Initial/Next screening at age: 45  · Repeat colon cancer screening: N/A at this time  · Prostate: PSA checked annually but defer AICHA until 50  · Testicular: Recommended monthly self exam  · Skin: Monthly self skin examination, annual exam by health professional  · Lung: Does not meet criteria for lung cancer screening.   · Other:    Screening Labs & Tests:  · Lab results reviewed & discussed with with patient or orders placed today.  · EKG:  · CV Screening: No special screening needed  · DEXA (75+ or risk factors):   · HEP C (If born [id number] or risk factors): Not indicated    Immunization/Vaccinations (to be given today unless deferred by patient)  · Influenza: Patient plans to get the flu vaccine at pharmacy/work/outside facility  · Hepatitis A: Verify immunization records  · Tetanus/Pertussis: Administer today  · Pneumovax: Not needed at this time  · Shingles: Not needed at this time  · Other:     Lifestyle Counseling:  · Lifestyle Modifications: Attempt to lose weight, Increase intensity/regularity of aerobic exercise, Maintain a low  sugar/carbohydrate diet and Manage stress/anxiety better  · Safety Issues: Always wear seatbelt, Avoid texting while driving   · Use sunscreen, regular skin examination  · Recommended annual dental/vision examination.  · Emotional/Stress/Sleep: Reviewed and  given when appropriate      Health Maintenance   Topic Date Due   • TDAP/TD VACCINES (1 - Tdap) 04/28/1997   • ANNUAL PHYSICAL  09/12/2020   • INFLUENZA VACCINE  Addressed         **Jeri Disclaimer:   Much of this encounter note is an electronic transcription/translation of spoken language to printed text. The electronic translation of spoken language may permit erroneous, or at times, nonsensical words or phrases to be inadvertently transcribed. Although I have reviewed the note for such errors, some may still exist.       Template created by Milton Ayala MD

## 2019-09-11 NOTE — ASSESSMENT & PLAN NOTE
This is a new problem to this examiner.   Fasting sugar 105. A1c is little higher.   Maintain a low sugar/starch/carbohydrate diet.     Lab Results   Component Value Date    HGBA1C 5.5 09/11/2019    HGBA1C 4.97 03/22/2018

## 2019-09-12 ENCOUNTER — TELEPHONE (OUTPATIENT)
Dept: INTERNAL MEDICINE | Age: 39
End: 2019-09-12

## 2019-09-12 NOTE — TELEPHONE ENCOUNTER
Send him information on diet. We discussed in depth on his visit.   He may also make an appointment to discuss this further if he needs it.

## 2019-09-12 NOTE — TELEPHONE ENCOUNTER
Regarding: Test Results Question  Contact: [phone]  ----- Message from [employer], Generic sent at 9/11/2019  5:37 PM EDT -----    I have a question about POCT GLYCOSYLATED HEMOGLOBIN (HGB A1C) resulted on 9/11/19, 3:45 PM.    What is the cause for the high glucose level .Apart from food and exercise ,what can be done to contain it .Can anything be done to increase rate of metabolism     Thanks

## 2020-01-13 ENCOUNTER — OFFICE VISIT (OUTPATIENT)
Dept: INTERNAL MEDICINE | Age: 40
End: 2020-01-13

## 2020-01-13 VITALS
SYSTOLIC BLOOD PRESSURE: 100 MMHG | DIASTOLIC BLOOD PRESSURE: 60 MMHG | OXYGEN SATURATION: 98 % | WEIGHT: 166 LBS | HEART RATE: 52 BPM | TEMPERATURE: 96 F | HEIGHT: 68 IN | BODY MASS INDEX: 25.16 KG/M2

## 2020-01-13 DIAGNOSIS — E03.9 HYPOTHYROIDISM, UNSPECIFIED TYPE: Primary | Chronic | ICD-10-CM

## 2020-01-13 DIAGNOSIS — E66.3 OVERWEIGHT (BMI 25.0-29.9): Chronic | ICD-10-CM

## 2020-01-13 DIAGNOSIS — M25.569 KNEE PAIN, UNSPECIFIED CHRONICITY, UNSPECIFIED LATERALITY: ICD-10-CM

## 2020-01-13 DIAGNOSIS — R73.01 IFG (IMPAIRED FASTING GLUCOSE): Chronic | ICD-10-CM

## 2020-01-13 LAB
T4 FREE SERPL-MCNC: 1.34 NG/DL (ref 0.93–1.7)
TSH SERPL DL<=0.005 MIU/L-ACNC: 3.11 UIU/ML (ref 0.27–4.2)

## 2020-01-13 PROCEDURE — 99214 OFFICE O/P EST MOD 30 MIN: CPT | Performed by: INTERNAL MEDICINE

## 2020-01-13 NOTE — ASSESSMENT & PLAN NOTE
Modify exercise if needed.   Educational handout given on knee conditioning.   See me for this problem if continues.

## 2020-01-13 NOTE — PROGRESS NOTES
[facility] [place] [facility]  BRAN MARTINEZ M.D.      Brayan Martins / 39 y.o. / male  01/13/2020      CHIEF COMPLAINT     Hypothyroidism and Weight Check      ASSESSMENT & PLAN     Problem List Items Addressed This Visit        High    Hypothyroidism - Primary (Chronic)    Current Assessment & Plan     Lab Results   Component Value Date    TSH 2.270 08/30/2019    TSH 3.450 04/05/2019    TSH 2.550 07/30/2018    FREET4 1.34 04/05/2019    FREET4 1.47 07/30/2018    FREET4 1.38 05/23/2018        Check labs today.          Relevant Medications    levothyroxine (SYNTHROID, LEVOTHROID) 75 MCG tablet    Other Relevant Orders    TSH+Free T4       Medium    IFG (impaired fasting glucose) (Chronic)    Current Assessment & Plan     Maintain a low sugar/starch/carbohydrate diet and exercise regularly. Wt loss advised.      Lab Results   Component Value Date     (H) 08/30/2019    GLU 98 03/22/2018     Lab Results   Component Value Date    HGBA1C 5.5 09/11/2019    HGBA1C 4.97 03/22/2018                Low    Overweight (BMI 25.0-29.9) (Chronic)    Current Assessment & Plan     BMI Readings from Last 3 Encounters:   01/13/20 25.25 kg/m²   09/11/19 26.13 kg/m²   04/05/19 25.70 kg/m²      Wt Readings from Last 3 Encounters:   01/13/20 75.3 kg (166 lb)   09/11/19 77.9 kg (171 lb 12.8 oz)   04/05/19 76.7 kg (169 lb)        Continue good lifestyle changes.          Knee pain    Current Assessment & Plan     Modify exercise if needed.   Educational handout given on knee conditioning.   See me for this problem if continues.              Orders Placed This Encounter   Procedures   • TSH+Free T4     No orders of the defined types were placed in this encounter.      Summary/Discussion:  ·       Next Appointment with me: Visit date not found    Return in about 6 months (around 7/13/2020) for Reassess chronic medical problems, COME FASTING FOR LABS.      MEDICATIONS     Current Outpatient Medications   Medication Sig Dispense Refill   •  "levothyroxine (SYNTHROID, LEVOTHROID) 75 MCG tablet Take 1 tablet by mouth Every Morning. 30 tablet 3     No current facility-administered medications for this visit.           VITAL SIGNS     Visit Vitals  /60   Pulse 52   Temp 96 °F (35.6 °C)   Ht 172.7 cm (67.99\")   Wt 75.3 kg (166 lb)   SpO2 98%   BMI 25.25 kg/m²       BP Readings from Last 3 Encounters:   01/13/20 100/60   09/11/19 122/78   04/05/19 112/68     Wt Readings from Last 3 Encounters:   01/13/20 75.3 kg (166 lb)   09/11/19 77.9 kg (171 lb 12.8 oz)   04/05/19 76.7 kg (169 lb)      Body mass index is 25.25 kg/m².      HISTORY OF PRESENT ILLNESS     Chronic hypothyroidism:  Previously increased dose of levothyroxine, weight loss, denies palpitation or chest pain, denies increased anxiety or depression  Lab Results   Component Value Date    TSH 2.270 08/30/2019    TSH 3.450 04/05/2019    FREET4 1.34 04/05/2019    FREET4 1.47 07/30/2018     Impaired fasting glucose and overweight: has worked on reducing carbohydrates in his diet and exercises. Stands up at work all day now.   Lab Results   Component Value Date     (H) 08/30/2019    GLU 98 03/22/2018     Lab Results   Component Value Date    HGBA1C 5.5 09/11/2019    HGBA1C 4.97 03/22/2018      Complains of knee pain, worse with exercise. No swelling or injury.       Patient Care Team:  Kyrie Ayala MD as PCP - General (Internal Medicine)      REVIEW OF SYSTEMS     Review of Systems  Constitutional neg x intended wt loss   Resp neg  CV neg  MuSk bilateral knee pain without swelling       PHYSICAL EXAMINATION     Physical Exam  Constitutional  No distress  Neck: No mass, thyroid nodule, thyromegaly or cervical LAD   Cardiovascular Rate  normal . Rhythm: regular . Heart sounds:  Normal  Pulmonary/Chest: Effort normal and breath sounds normal.   Psychiatric  Alert. Judgment intact. Thought content normal. Mood normal      REVIEWED DATA     Labs:     Lab Results   Component Value Date     " 08/30/2019    K 4.4 08/30/2019    CALCIUM 9.5 08/30/2019    AST 15 08/30/2019    ALT 13 08/30/2019    BUN 10 08/30/2019    CREATININE 0.92 08/30/2019    CREATININE 1.04 03/22/2018    EGFRIFNONA 91 08/30/2019    EGFRIFAFRI 110 08/30/2019       Lab Results   Component Value Date    HGBA1C 5.5 09/11/2019    HGBA1C 4.97 03/22/2018     (H) 08/30/2019    GLU 98 03/22/2018       Lab Results   Component Value Date    LDL 97 08/30/2019     (H) 03/22/2018    HDL 36 (L) 08/30/2019    HDL 39 (L) 03/22/2018    TRIG 119 08/30/2019    TRIG 71 03/22/2018    CHOLHDLRATIO 4.36 08/30/2019    CHOLHDLRATIO 3.97 03/22/2018       Lab Results   Component Value Date    TSH 2.270 08/30/2019    FREET4 1.34 04/05/2019       Lab Results   Component Value Date    WBC 4.39 08/30/2019    HGB 14.1 08/30/2019    HGB 14.2 03/22/2018     08/30/2019       Lab Results   Component Value Date    PROTEIN Negative 08/30/2019    GLUCOSEU Negative 08/30/2019    BLOODU Negative 08/30/2019    NITRITEU Negative 08/30/2019    LEUKOCYTESUR Negative 08/30/2019       Imaging:         Medical Tests:         Summary of old records / correspondence / consultant report:         Request outside records:         ALLERGIES  No Known Allergies     PFSH:     The following portions of the patient's history were reviewed and updated as appropriate: Allergies / Current Medications / Past Medical History / Surgical History / Social History / Family History    PROBLEM LIST   Patient Active Problem List   Diagnosis   • Hypothyroidism   • Overweight (BMI 25.0-29.9)   • Vitamin D deficiency   • IFG (impaired fasting glucose)   • Knee pain       PAST MEDICAL HISTORY  Past Medical History:   Diagnosis Date   • Hypothyroidism    • Kidney stone 2008    passed       SURGICAL HISTORY  Past Surgical History:   Procedure Laterality Date   • CIRCUMCISION  2009       SOCIAL HISTORY  Social History     Socioeconomic History   • Marital status:      Spouse name:  Mikki   • Number of children: 2   • Years of education: Not on file   • Highest education level: Not on file   Occupational History   • Occupation:  ([employer])   Tobacco Use   • Smoking status: Never Smoker   • Smokeless tobacco: Never Used   Substance and Sexual Activity   • Alcohol use: Yes     Comment: occ   • Drug use: No   • Sexual activity: Yes     Partners: Female     Birth control/protection: Surgical   Lifestyle   • Physical activity:     Days per week: 7 days     Minutes per session: Not on file   • Stress: To some extent       FAMILY HISTORY  Family History   Problem Relation Age of Onset   • Hypertension Father    • Coronary artery disease Father 50        s/p cabg   • No Known Problems Mother    • No Known Problems Sister    • No Known Problems Maternal Grandmother    • No Known Problems Maternal Grandfather    • No Known Problems Paternal Grandmother    • Hypertension Paternal Grandfather    • Coronary artery disease Paternal Aunt    • Diabetes Neg Hx    • Thyroid disease Neg Hx    • Cancer Neg Hx          **Dragon Disclaimer:   Much of this encounter note is an electronic transcription/translation of spoken language to printed text. The electronic translation of spoken language may permit erroneous, or at times, nonsensical words or phrases to be inadvertently transcribed. Although I have reviewed the note for such errors, some may still exist.       Template created by Milton Ayala MD

## 2020-01-13 NOTE — ASSESSMENT & PLAN NOTE
Maintain a low sugar/starch/carbohydrate diet and exercise regularly. Wt loss advised.      Lab Results   Component Value Date     (H) 08/30/2019    GLU 98 03/22/2018     Lab Results   Component Value Date    HGBA1C 5.5 09/11/2019    HGBA1C 4.97 03/22/2018

## 2020-01-13 NOTE — PATIENT INSTRUCTIONS
** IMPORTANT MESSAGE FROM DR. MARTINEZ **    In our office, your satisfaction is VERY important to us.     You may receive a survey from [employer]ane[employer] by mail or E-mail for you to provide feedback about your visit. This information is invaluable for me to know what we can do to improve our services.     I ask that you please take a few minutes to complete the survey and let us know how we are doing in serving your needs. (You may receive the survey more than once for multiple visits)    Thank You !    Dr. Martinez & Staff    __________________________________________________________      ADDITIONAL INSTRUCTION / REMINDERS FROM DR. MARTINEZ

## 2020-01-13 NOTE — ASSESSMENT & PLAN NOTE
BMI Readings from Last 3 Encounters:   01/13/20 25.25 kg/m²   09/11/19 26.13 kg/m²   04/05/19 25.70 kg/m²      Wt Readings from Last 3 Encounters:   01/13/20 75.3 kg (166 lb)   09/11/19 77.9 kg (171 lb 12.8 oz)   04/05/19 76.7 kg (169 lb)        Continue good lifestyle changes.

## 2020-01-13 NOTE — ASSESSMENT & PLAN NOTE
Lab Results   Component Value Date    TSH 2.270 08/30/2019    TSH 3.450 04/05/2019    TSH 2.550 07/30/2018    FREET4 1.34 04/05/2019    FREET4 1.47 07/30/2018    FREET4 1.38 05/23/2018        Check labs today.

## 2020-01-14 ENCOUNTER — TELEPHONE (OUTPATIENT)
Dept: INTERNAL MEDICINE | Age: 40
End: 2020-01-14

## 2020-01-14 DIAGNOSIS — E03.9 HYPOTHYROIDISM, UNSPECIFIED TYPE: Chronic | ICD-10-CM

## 2020-01-14 RX ORDER — LEVOTHYROXINE SODIUM 88 UG/1
88 TABLET ORAL EVERY MORNING
Qty: 30 TABLET | Refills: 3 | Status: SHIPPED | OUTPATIENT
Start: 2020-01-14 | End: 2020-08-04 | Stop reason: SDUPTHER

## 2020-01-14 NOTE — TELEPHONE ENCOUNTER
----- Message from Brayan Martins sent at 1/14/2020 10:43 AM EST -----  Regarding: Test Results Question  Contact: [phone]  I have a question about TSH+FREE T4 resulted on 1/13/20, 7:07 PM.    I have been regular with my medicine .What is the reason for the increase as shown by result .    Thanks  Brayan

## 2020-01-14 NOTE — PROGRESS NOTES
Call patient with results:    Increase levothyroxine to 88 mcg (verify on 75 mcg). Recheck TFT in 3 months.

## 2020-04-08 ENCOUNTER — RESULTS ENCOUNTER (OUTPATIENT)
Dept: INTERNAL MEDICINE | Age: 40
End: 2020-04-08

## 2020-04-08 DIAGNOSIS — E03.9 HYPOTHYROIDISM, UNSPECIFIED TYPE: Chronic | ICD-10-CM

## 2020-07-13 ENCOUNTER — OFFICE VISIT (OUTPATIENT)
Dept: INTERNAL MEDICINE | Age: 40
End: 2020-07-13

## 2020-07-13 VITALS
HEART RATE: 56 BPM | OXYGEN SATURATION: 99 % | SYSTOLIC BLOOD PRESSURE: 100 MMHG | HEIGHT: 68 IN | DIASTOLIC BLOOD PRESSURE: 66 MMHG | WEIGHT: 143 LBS | BODY MASS INDEX: 21.67 KG/M2 | TEMPERATURE: 96.3 F

## 2020-07-13 DIAGNOSIS — R63.4 RAPID WEIGHT LOSS: ICD-10-CM

## 2020-07-13 DIAGNOSIS — R73.01 IFG (IMPAIRED FASTING GLUCOSE): Chronic | ICD-10-CM

## 2020-07-13 DIAGNOSIS — E03.9 HYPOTHYROIDISM, UNSPECIFIED TYPE: Primary | Chronic | ICD-10-CM

## 2020-07-13 PROBLEM — E66.3 OVERWEIGHT (BMI 25.0-29.9): Chronic | Status: RESOLVED | Noted: 2018-03-20 | Resolved: 2020-07-13

## 2020-07-13 LAB
ALBUMIN SERPL-MCNC: 5 G/DL (ref 3.5–5.2)
ALBUMIN/GLOB SERPL: 2.1 G/DL
ALP SERPL-CCNC: 96 U/L (ref 39–117)
ALT SERPL-CCNC: 9 U/L (ref 1–41)
AST SERPL-CCNC: 17 U/L (ref 1–40)
BASOPHILS # BLD AUTO: 0.02 10*3/MM3 (ref 0–0.2)
BASOPHILS NFR BLD AUTO: 0.5 % (ref 0–1.5)
BILIRUB SERPL-MCNC: 1.8 MG/DL (ref 0–1.2)
BUN SERPL-MCNC: 14 MG/DL (ref 6–20)
BUN/CREAT SERPL: 14 (ref 7–25)
CALCIUM SERPL-MCNC: 9.9 MG/DL (ref 8.6–10.5)
CHLORIDE SERPL-SCNC: 100 MMOL/L (ref 98–107)
CO2 SERPL-SCNC: 26.9 MMOL/L (ref 22–29)
CREAT SERPL-MCNC: 1 MG/DL (ref 0.76–1.27)
EOSINOPHIL # BLD AUTO: 0.09 10*3/MM3 (ref 0–0.4)
EOSINOPHIL NFR BLD AUTO: 2.1 % (ref 0.3–6.2)
ERYTHROCYTE [DISTWIDTH] IN BLOOD BY AUTOMATED COUNT: 12.3 % (ref 12.3–15.4)
GLOBULIN SER CALC-MCNC: 2.4 GM/DL
GLUCOSE SERPL-MCNC: 82 MG/DL (ref 65–99)
HBA1C MFR BLD: 5.3 % (ref 4.8–5.6)
HCT VFR BLD AUTO: 40.9 % (ref 37.5–51)
HGB BLD-MCNC: 14.1 G/DL (ref 13–17.7)
IMM GRANULOCYTES # BLD AUTO: 0.01 10*3/MM3 (ref 0–0.05)
IMM GRANULOCYTES NFR BLD AUTO: 0.2 % (ref 0–0.5)
LYMPHOCYTES # BLD AUTO: 1.6 10*3/MM3 (ref 0.7–3.1)
LYMPHOCYTES NFR BLD AUTO: 36.4 % (ref 19.6–45.3)
MCH RBC QN AUTO: 30.7 PG (ref 26.6–33)
MCHC RBC AUTO-ENTMCNC: 34.5 G/DL (ref 31.5–35.7)
MCV RBC AUTO: 88.9 FL (ref 79–97)
MONOCYTES # BLD AUTO: 0.4 10*3/MM3 (ref 0.1–0.9)
MONOCYTES NFR BLD AUTO: 9.1 % (ref 5–12)
NEUTROPHILS # BLD AUTO: 2.27 10*3/MM3 (ref 1.7–7)
NEUTROPHILS NFR BLD AUTO: 51.7 % (ref 42.7–76)
NRBC BLD AUTO-RTO: 0 /100 WBC (ref 0–0.2)
PLATELET # BLD AUTO: 186 10*3/MM3 (ref 140–450)
POTASSIUM SERPL-SCNC: 4 MMOL/L (ref 3.5–5.2)
PROT SERPL-MCNC: 7.4 G/DL (ref 6–8.5)
RBC # BLD AUTO: 4.6 10*6/MM3 (ref 4.14–5.8)
SODIUM SERPL-SCNC: 139 MMOL/L (ref 136–145)
T4 FREE SERPL-MCNC: 1.26 NG/DL (ref 0.93–1.7)
TSH SERPL DL<=0.005 MIU/L-ACNC: 0.64 UIU/ML (ref 0.27–4.2)
WBC # BLD AUTO: 4.39 10*3/MM3 (ref 3.4–10.8)

## 2020-07-13 PROCEDURE — 99214 OFFICE O/P EST MOD 30 MIN: CPT | Performed by: INTERNAL MEDICINE

## 2020-07-13 NOTE — ASSESSMENT & PLAN NOTE
Wt loss of 23 lbs since 1/2020.   Wt Readings from Last 3 Encounters:   07/13/20 64.9 kg (143 lb)   01/13/20 75.3 kg (166 lb)   09/11/19 77.9 kg (171 lb 12.8 oz)        Avoid intermittent fasting. Check lab(s). Monitor closely for ongoing wt loss. Follow-up 3 months.

## 2020-07-13 NOTE — ASSESSMENT & PLAN NOTE
Wt loss of 23 lbs since 1/2020 with intermittent fasting and avoidance of rice.   Check A1c. Due to wt loss advised against fasting.

## 2020-07-13 NOTE — ASSESSMENT & PLAN NOTE
Noted wt loss of 23 lbs since 1/2020.   Check TSH and Free T4     Lab Results   Component Value Date    TSH 3.110 01/13/2020    TSH 2.270 08/30/2019    TSH 3.450 04/05/2019    FREET4 1.34 01/13/2020    FREET4 1.34 04/05/2019    FREET4 1.47 07/30/2018

## 2020-08-03 DIAGNOSIS — E03.9 HYPOTHYROIDISM, UNSPECIFIED TYPE: Chronic | ICD-10-CM

## 2020-08-04 RX ORDER — LEVOTHYROXINE SODIUM 88 UG/1
88 TABLET ORAL EVERY MORNING
Qty: 30 TABLET | Refills: 11 | Status: SHIPPED | OUTPATIENT
Start: 2020-08-04 | End: 2021-04-08

## 2020-10-07 ENCOUNTER — OFFICE VISIT (OUTPATIENT)
Dept: INTERNAL MEDICINE | Age: 40
End: 2020-10-07

## 2020-10-07 VITALS
BODY MASS INDEX: 21.67 KG/M2 | SYSTOLIC BLOOD PRESSURE: 110 MMHG | HEIGHT: 68 IN | WEIGHT: 143 LBS | DIASTOLIC BLOOD PRESSURE: 60 MMHG | HEART RATE: 75 BPM | TEMPERATURE: 97.4 F | OXYGEN SATURATION: 98 %

## 2020-10-07 DIAGNOSIS — E03.9 HYPOTHYROIDISM, UNSPECIFIED TYPE: Primary | Chronic | ICD-10-CM

## 2020-10-07 DIAGNOSIS — R63.4 RAPID WEIGHT LOSS: ICD-10-CM

## 2020-10-07 PROCEDURE — 90686 IIV4 VACC NO PRSV 0.5 ML IM: CPT | Performed by: INTERNAL MEDICINE

## 2020-10-07 PROCEDURE — 99213 OFFICE O/P EST LOW 20 MIN: CPT | Performed by: INTERNAL MEDICINE

## 2020-10-07 PROCEDURE — 90471 IMMUNIZATION ADMIN: CPT | Performed by: INTERNAL MEDICINE

## 2020-10-07 NOTE — PROGRESS NOTES
"[facility] INTERNAL MEDICINE  BRAN MARTINEZ M.D.      Brayan Elieezr / 40 y.o. / male  10/07/2020      CHIEF COMPLAINT     abnormal weight loss      ASSESSMENT & PLAN     Problem List Items Addressed This Visit        High    Hypothyroidism - Primary (Chronic)    Current Assessment & Plan     Continue levothyroxine 88 mcg.          Relevant Medications    levothyroxine (SYNTHROID, LEVOTHROID) 88 MCG tablet    Rapid weight loss    Current Assessment & Plan     Wt has stabilized since 7/2020.   Wt Readings from Last 3 Encounters:   10/07/20 64.9 kg (143 lb)   07/13/20 64.9 kg (143 lb)   01/13/20 75.3 kg (166 lb)      Monitor.              Orders Placed This Encounter   Procedures   • Fluarix Quad >6 Months (VFC) (0789-0455)     No orders of the defined types were placed in this encounter.      Summary/Discussion:  •       Next Appointment with me: Visit date not found    Return in about 6 months (around 4/7/2021) for ANNUAL PHYSICAL, Hypothyroidism.    ____________________________________________________________________    MEDICATIONS     Current Outpatient Medications   Medication Sig Dispense Refill   • levothyroxine (SYNTHROID, LEVOTHROID) 88 MCG tablet TAKE 1 TABLET BY MOUTH EVERY MORNING 30 tablet 11     No current facility-administered medications for this visit.         VITAL SIGNS       Visit Vitals  /60   Pulse 75   Temp 97.4 °F (36.3 °C)   Ht 172.7 cm (67.99\")   Wt 64.9 kg (143 lb)   SpO2 98%   BMI 21.75 kg/m²       BP Readings from Last 3 Encounters:   10/07/20 110/60   07/13/20 100/66   01/13/20 100/60     Wt Readings from Last 3 Encounters:   10/07/20 64.9 kg (143 lb)   07/13/20 64.9 kg (143 lb)   01/13/20 75.3 kg (166 lb)      Body mass index is 21.75 kg/m².      HISTORY OF PRESENT ILLNESS     No further wt loss since 7/2020. Has been eating more protein and less carbohydrates.     Chronic hypothyroidism:  On stable dose of levothyroxine, no changes in physical symptoms  Lab Results   Component Value Date    " TSH 0.640 07/13/2020    TSH 3.110 01/13/2020    FREET4 1.26 07/13/2020    FREET4 1.34 01/13/2020          Patient Care Team:  Kyrie Ayala MD as PCP - General (Internal Medicine)  ____________________________________________________________________    REVIEW OF SYSTEMS     Review of Systems  No wt loss since last visit, no night sweat or fatigue  GI neg  Psych stable mood     PHYSICAL EXAM      Physical Exam  No further wt loss, looks well   Alert with normal thought and judgment      REVIEWED DATA     Labs:     Lab Results   Component Value Date     07/13/2020    K 4.0 07/13/2020    CALCIUM 9.9 07/13/2020    AST 17 07/13/2020    ALT 9 07/13/2020    BUN 14 07/13/2020    CREATININE 1.00 07/13/2020    CREATININE 0.92 08/30/2019    CREATININE 1.04 03/22/2018    EGFRIFNONA 82 07/13/2020    EGFRIFAFRI 99 07/13/2020       Lab Results   Component Value Date    HGBA1C 5.30 07/13/2020    HGBA1C 5.5 09/11/2019    HGBA1C 4.97 03/22/2018    GLU 82 07/13/2020     (H) 08/30/2019    GLU 98 03/22/2018       Lab Results   Component Value Date    LDL 97 08/30/2019     (H) 03/22/2018    HDL 36 (L) 08/30/2019    HDL 39 (L) 03/22/2018    TRIG 119 08/30/2019    TRIG 71 03/22/2018    CHOLHDLRATIO 4.36 08/30/2019    CHOLHDLRATIO 3.97 03/22/2018       Lab Results   Component Value Date    TSH 0.640 07/13/2020    FREET4 1.26 07/13/2020       Lab Results   Component Value Date    WBC 4.39 07/13/2020    HGB 14.1 07/13/2020    HGB 14.1 08/30/2019    HGB 14.2 03/22/2018     07/13/2020       Lab Results   Component Value Date    PROTEIN Negative 08/30/2019    GLUCOSEU Negative 08/30/2019    BLOODU Negative 08/30/2019    NITRITEU Negative 08/30/2019    LEUKOCYTESUR Negative 08/30/2019        Imaging:         Medical Tests:         Summary of old records / correspondence / consultant report:         Request outside records:         ALLERGIES  No Known Allergies     PFSH:     The following portions of the patient's history  were reviewed and updated as appropriate: Allergies / Current Medications / Past Medical History / Surgical History / Social History / Family History    PROBLEM LIST   Patient Active Problem List   Diagnosis   • Hypothyroidism   • Vitamin D deficiency   • IFG (impaired fasting glucose)   • Rapid weight loss       PAST MEDICAL HISTORY  Past Medical History:   Diagnosis Date   • Hypothyroidism    • Kidney stone 2008    passed       SURGICAL HISTORY  Past Surgical History:   Procedure Laterality Date   • CIRCUMCISION  2009       SOCIAL HISTORY  Social History     Socioeconomic History   • Marital status:      Spouse name: Mikki   • Number of children: 2   • Years of education: Not on file   • Highest education level: Not on file   Occupational History   • Occupation:  [employer])   Tobacco Use   • Smoking status: Never Smoker   • Smokeless tobacco: Never Used   Substance and Sexual Activity   • Alcohol use: Yes     Comment: occ   • Drug use: No   • Sexual activity: Yes     Partners: Female     Birth control/protection: Surgical   Lifestyle   • Physical activity     Days per week: 7 days     Minutes per session: Not on file   • Stress: To some extent       FAMILY HISTORY  Family History   Problem Relation Age of Onset   • Hypertension Father    • Coronary artery disease Father 50        s/p cabg   • No Known Problems Mother    • No Known Problems Sister    • No Known Problems Maternal Grandmother    • No Known Problems Maternal Grandfather    • No Known Problems Paternal Grandmother    • Hypertension Paternal Grandfather    • Coronary artery disease Paternal Aunt    • Diabetes Neg Hx    • Thyroid disease Neg Hx    • Cancer Neg Hx          Examiner was wearing KN95 mask, face shield and exam gloves during the entire duration of the visit. Patient was masked the entire time.   Minimum social distance of 6 ft maintained entire visit except if physical contact was necessary as documented.     **Jeri  Disclaimer:   Much of this encounter note is an electronic transcription/translation of spoken language to printed text. The electronic translation of spoken language may permit erroneous, or at times, nonsensical words or phrases to be inadvertently transcribed. Although I have reviewed the note for such errors, some may still exist.     Template created by Milton Ayala MD

## 2020-10-07 NOTE — ASSESSMENT & PLAN NOTE
Wt has stabilized since 7/2020.   Wt Readings from Last 3 Encounters:   10/07/20 64.9 kg (143 lb)   07/13/20 64.9 kg (143 lb)   01/13/20 75.3 kg (166 lb)      Monitor.

## 2021-03-18 DIAGNOSIS — Z12.5 ENCOUNTER FOR SCREENING FOR MALIGNANT NEOPLASM OF PROSTATE: ICD-10-CM

## 2021-03-18 DIAGNOSIS — Z00.00 PREVENTATIVE HEALTH CARE: Primary | ICD-10-CM

## 2021-03-26 DIAGNOSIS — Z12.5 ENCOUNTER FOR SCREENING FOR MALIGNANT NEOPLASM OF PROSTATE: ICD-10-CM

## 2021-03-26 DIAGNOSIS — Z00.00 PREVENTATIVE HEALTH CARE: ICD-10-CM

## 2021-04-02 LAB
ALBUMIN SERPL-MCNC: 4.8 G/DL (ref 3.5–5.2)
ALBUMIN/GLOB SERPL: 1.8 G/DL
ALP SERPL-CCNC: 99 U/L (ref 39–117)
ALT SERPL-CCNC: 14 U/L (ref 1–41)
APPEARANCE UR: CLEAR
AST SERPL-CCNC: 19 U/L (ref 1–40)
BACTERIA #/AREA URNS HPF: ABNORMAL /HPF
BASOPHILS # BLD AUTO: 0.02 10*3/MM3 (ref 0–0.2)
BASOPHILS NFR BLD AUTO: 0.5 % (ref 0–1.5)
BILIRUB SERPL-MCNC: 1.3 MG/DL (ref 0–1.2)
BILIRUB UR QL STRIP: NEGATIVE
BUN SERPL-MCNC: 12 MG/DL (ref 6–20)
BUN/CREAT SERPL: 14.5 (ref 7–25)
CALCIUM SERPL-MCNC: 10 MG/DL (ref 8.6–10.5)
CASTS URNS MICRO: ABNORMAL
CHLORIDE SERPL-SCNC: 101 MMOL/L (ref 98–107)
CHOLEST SERPL-MCNC: 134 MG/DL (ref 0–200)
CHOLEST/HDLC SERPL: 3.12 {RATIO}
CO2 SERPL-SCNC: 28 MMOL/L (ref 22–29)
COLOR UR: YELLOW
CREAT SERPL-MCNC: 0.83 MG/DL (ref 0.76–1.27)
EOSINOPHIL # BLD AUTO: 0.04 10*3/MM3 (ref 0–0.4)
EOSINOPHIL NFR BLD AUTO: 1.1 % (ref 0.3–6.2)
EPI CELLS #/AREA URNS HPF: ABNORMAL /HPF
ERYTHROCYTE [DISTWIDTH] IN BLOOD BY AUTOMATED COUNT: 12.1 % (ref 12.3–15.4)
GLOBULIN SER CALC-MCNC: 2.7 GM/DL
GLUCOSE SERPL-MCNC: 80 MG/DL (ref 65–99)
GLUCOSE UR QL: NEGATIVE
HBA1C MFR BLD: 5.59 % (ref 4.8–5.6)
HCT VFR BLD AUTO: 41.4 % (ref 37.5–51)
HCV AB S/CO SERPL IA: <0.1 S/CO RATIO (ref 0–0.9)
HDLC SERPL-MCNC: 43 MG/DL (ref 40–60)
HGB BLD-MCNC: 14.3 G/DL (ref 13–17.7)
HGB UR QL STRIP: ABNORMAL
IMM GRANULOCYTES # BLD AUTO: 0.01 10*3/MM3 (ref 0–0.05)
IMM GRANULOCYTES NFR BLD AUTO: 0.3 % (ref 0–0.5)
KETONES UR QL STRIP: NEGATIVE
LDLC SERPL CALC-MCNC: 76 MG/DL (ref 0–100)
LEUKOCYTE ESTERASE UR QL STRIP: NEGATIVE
LYMPHOCYTES # BLD AUTO: 1.24 10*3/MM3 (ref 0.7–3.1)
LYMPHOCYTES NFR BLD AUTO: 32.7 % (ref 19.6–45.3)
MCH RBC QN AUTO: 30.8 PG (ref 26.6–33)
MCHC RBC AUTO-ENTMCNC: 34.5 G/DL (ref 31.5–35.7)
MCV RBC AUTO: 89 FL (ref 79–97)
MONOCYTES # BLD AUTO: 0.49 10*3/MM3 (ref 0.1–0.9)
MONOCYTES NFR BLD AUTO: 12.9 % (ref 5–12)
NEUTROPHILS # BLD AUTO: 1.99 10*3/MM3 (ref 1.7–7)
NEUTROPHILS NFR BLD AUTO: 52.5 % (ref 42.7–76)
NITRITE UR QL STRIP: NEGATIVE
NRBC BLD AUTO-RTO: 0 /100 WBC (ref 0–0.2)
PH UR STRIP: 6 [PH] (ref 5–8)
PLATELET # BLD AUTO: 165 10*3/MM3 (ref 140–450)
POTASSIUM SERPL-SCNC: 4.3 MMOL/L (ref 3.5–5.2)
PROT SERPL-MCNC: 7.5 G/DL (ref 6–8.5)
PROT UR QL STRIP: NEGATIVE
PSA SERPL-MCNC: 0.4 NG/ML (ref 0–4)
RBC # BLD AUTO: 4.65 10*6/MM3 (ref 4.14–5.8)
RBC #/AREA URNS HPF: ABNORMAL /HPF
SODIUM SERPL-SCNC: 140 MMOL/L (ref 136–145)
SP GR UR: 1.01 (ref 1–1.03)
T4 FREE SERPL-MCNC: 1.25 NG/DL (ref 0.93–1.7)
TRIGL SERPL-MCNC: 73 MG/DL (ref 0–150)
TSH SERPL DL<=0.005 MIU/L-ACNC: 5.47 UIU/ML (ref 0.27–4.2)
UROBILINOGEN UR STRIP-MCNC: ABNORMAL MG/DL
VLDLC SERPL CALC-MCNC: 15 MG/DL (ref 5–40)
WBC # BLD AUTO: 3.79 10*3/MM3 (ref 3.4–10.8)
WBC #/AREA URNS HPF: ABNORMAL /HPF

## 2021-04-08 ENCOUNTER — OFFICE VISIT (OUTPATIENT)
Dept: INTERNAL MEDICINE | Age: 41
End: 2021-04-08

## 2021-04-08 VITALS
OXYGEN SATURATION: 99 % | HEART RATE: 61 BPM | WEIGHT: 147 LBS | SYSTOLIC BLOOD PRESSURE: 120 MMHG | TEMPERATURE: 98 F | HEIGHT: 68 IN | DIASTOLIC BLOOD PRESSURE: 70 MMHG | BODY MASS INDEX: 22.28 KG/M2

## 2021-04-08 DIAGNOSIS — E03.9 HYPOTHYROIDISM, UNSPECIFIED TYPE: Primary | Chronic | ICD-10-CM

## 2021-04-08 DIAGNOSIS — L85.3 DRY SKIN DERMATITIS: ICD-10-CM

## 2021-04-08 DIAGNOSIS — R31.21 ASYMPTOMATIC MICROSCOPIC HEMATURIA: ICD-10-CM

## 2021-04-08 DIAGNOSIS — R73.01 IFG (IMPAIRED FASTING GLUCOSE): Chronic | ICD-10-CM

## 2021-04-08 DIAGNOSIS — Z00.00 ENCOUNTER FOR ANNUAL HEALTH EXAMINATION: ICD-10-CM

## 2021-04-08 DIAGNOSIS — Z87.442 HISTORY OF KIDNEY STONES: ICD-10-CM

## 2021-04-08 PROCEDURE — 99396 PREV VISIT EST AGE 40-64: CPT | Performed by: INTERNAL MEDICINE

## 2021-04-08 PROCEDURE — 99214 OFFICE O/P EST MOD 30 MIN: CPT | Performed by: INTERNAL MEDICINE

## 2021-04-08 RX ORDER — LEVOTHYROXINE SODIUM 112 UG/1
112 TABLET ORAL
Qty: 30 TABLET | Refills: 2 | Status: SHIPPED | OUTPATIENT
Start: 2021-04-08 | End: 2021-06-18

## 2021-04-08 NOTE — ASSESSMENT & PLAN NOTE
A1c for average glucose level is little higher. Maintain a low sugar/starch/carbohydrate diet and continue to exercise regularly.     Lab Results   Component Value Date    HGBA1C 5.59 04/01/2021    HGBA1C 5.30 07/13/2020    HGBA1C 5.5 09/11/2019

## 2021-04-08 NOTE — PATIENT INSTRUCTIONS
** IMPORTANT MESSAGE FROM DR. MARTINEZ **    In our office, your satisfaction is VERY important to us.     You may receive a survey from [facility] by mail or E-mail for you to provide feedback about your visit. This information is invaluable for me to know what we can do to improve our services.     I ask that you please take a few minutes to complete the survey and let us know how we are doing in serving your needs. (You may receive the survey more than once for multiple visits)    Thank You !    Dr. Martinez & Staff    _________________________________________________________________________________________________________________________      ** ADDITIONAL INSTRUCTION / REMINDERS FROM DR. MARTINEZ **

## 2021-04-08 NOTE — PROGRESS NOTES
"    I N T E R N A L  M E D I C I N E  J U N O H  K I M,  M D      ENCOUNTER DATE:  04/08/2021    Brayan Martins / 40 y.o. / male    CHIEF COMPLAINT     Annual Exam (9/2019), Eczema (tryed lotion vasline , not helping), and Hypothyroidism      VITALS     Visit Vitals  /70 (BP Location: Right arm)   Pulse 61   Temp 98 °F (36.7 °C)   Ht 172.7 cm (67.99\")   Wt 66.7 kg (147 lb)   SpO2 99%   BMI 22.36 kg/m²       BP Readings from Last 3 Encounters:   04/08/21 120/70   10/07/20 110/60   07/13/20 100/66     Wt Readings from Last 3 Encounters:   04/08/21 66.7 kg (147 lb)   10/07/20 64.9 kg (143 lb)   07/13/20 64.9 kg (143 lb)      Body mass index is 22.36 kg/m².    MEDICATIONS     Current Outpatient Medications on File Prior to Visit   Medication Sig Dispense Refill   • levothyroxine (SYNTHROID, LEVOTHROID) 88 MCG tablet TAKE 1 TABLET BY MOUTH EVERY MORNING 30 tablet 11     No current facility-administered medications on file prior to visit.         HISTORY OF PRESENT ILLNESS      Brayan presents for annual health maintenance visit.  Hypothyroidism on levothyroxine 88 mcg, TSH is higher and wt is up 5 lbs in spite of watching diet and exercise.   Dark pinkish urine, history of kidney stones, urinalysis showed microscopic hematuria without pain.   Complains of persistent dry chapping skin of hands. Washes hands constantly with antibacterial soap.     · Last health maintenance visit: approximately 1 year ago  · General health: some medical problems  · Lifestyle:  · Attempting to lose weight?: Yes   · Diet: eats moderately healthy  · Exercise: exercises nearly every day  · Tobacco: Never used   · Alcohol: occasional/rare  · Work: Full-time  · Reproductive health:  · Sexually active?: Yes   · Concern for STD?: No   · Sexual problems?: No problems   · Sees Urologist?: No   · Depression Screening:      PHQ-2/PHQ-9 Depression Screening 4/8/2021   Little interest or pleasure in doing things 0   Feeling down, depressed, or hopeless " 0   Total Score 0         PHQ-2: 0 (Not depressed)     PHQ-9: 0 (Negative screening for depression)    Patient Care Team:  Kyrie Ayala MD as PCP - General (Internal Medicine)  ______________________________________________________________________    ALLERGIES  No Known Allergies     PFSH:     The following portions of the patient's history were reviewed and updated as appropriate: Allergies / Current Medications / Past Medical History / Surgical History / Social History / Family History    PROBLEM LIST   Patient Active Problem List   Diagnosis   • Hypothyroidism   • Vitamin D deficiency   • IFG (impaired fasting glucose)   • Asymptomatic microscopic hematuria   • History of kidney stones   • Dry skin dermatitis       PAST MEDICAL HISTORY  Past Medical History:   Diagnosis Date   • Hypothyroidism    • Kidney stone 2008    passed       SURGICAL HISTORY  Past Surgical History:   Procedure Laterality Date   • CIRCUMCISION  2009       SOCIAL HISTORY  Social History     Socioeconomic History   • Marital status:      Spouse name: Mikki   • Number of children: 2   • Years of education: Not on file   • Highest education level: Not on file   Tobacco Use   • Smoking status: Never Smoker   • Smokeless tobacco: Never Used   Substance and Sexual Activity   • Alcohol use: Not Currently   • Drug use: No   • Sexual activity: Yes     Partners: Female     Birth control/protection: Surgical       FAMILY HISTORY  Family History   Problem Relation Age of Onset   • Hypertension Father    • Coronary artery disease Father 50        s/p cabg   • No Known Problems Mother    • No Known Problems Sister    • No Known Problems Maternal Grandmother    • No Known Problems Maternal Grandfather    • No Known Problems Paternal Grandmother    • Hypertension Paternal Grandfather    • Coronary artery disease Paternal Aunt    • Diabetes type II Maternal Uncle    • Diabetes Neg Hx    • Thyroid disease Neg Hx    • Cancer Neg Hx    • Colon polyps  Neg Hx    • Prostate cancer Neg Hx        IMMUNIZATION HISTORY  Immunization History   Administered Date(s) Administered   • COVID-19 (PFIZER) 03/16/2021   • Flulaval/Fluarix/Fluzone Quad 10/07/2020   • Influenza, Unspecified 02/24/2019   • Tdap 09/11/2019         REVIEW OF SYSTEMS     Review of Systems   Constitutional: Positive for unexpected weight change (5 lbs up). Negative for fatigue.   HENT: Negative.    Eyes: Negative.    Respiratory: Negative.    Cardiovascular: Negative.    Gastrointestinal: Negative.    Endocrine: Negative.    Genitourinary: Negative.  Negative for difficulty urinating, dysuria and flank pain. Hematuria: microscopic.   Musculoskeletal: Negative.    Skin: Negative.         Dry skin hands   Allergic/Immunologic: Negative.    Neurological: Negative.    Hematological: Negative.    Psychiatric/Behavioral: Negative.          PHYSICAL EXAMINATION     Physical Exam  Constitutional:       General: He is not in acute distress.     Appearance: He is well-developed.   HENT:      Head: Normocephalic and atraumatic.      Right Ear: Tympanic membrane, ear canal and external ear normal.      Left Ear: Tympanic membrane, ear canal and external ear normal.   Eyes:      General: No scleral icterus.     Conjunctiva/sclera: Conjunctivae normal.      Pupils: Pupils are equal, round, and reactive to light.   Neck:      Thyroid: No thyroid mass or thyromegaly.      Trachea: No tracheal deviation.   Cardiovascular:      Rate and Rhythm: Normal rate and regular rhythm.      Pulses: Normal pulses.      Heart sounds: Normal heart sounds.      Comments: No carotid bruit  Pulmonary:      Effort: Pulmonary effort is normal.      Breath sounds: Normal breath sounds.   Abdominal:      General: There is no distension.      Palpations: Abdomen is soft. There is no mass.      Tenderness: There is no abdominal tenderness.      Hernia: No hernia is present.   Musculoskeletal:      Cervical back: Neck supple.      Right lower  leg: No edema.      Left lower leg: No edema.   Lymphadenopathy:      Cervical: No cervical adenopathy.      Upper Body:      Right upper body: No supraclavicular adenopathy.      Left upper body: No supraclavicular adenopathy.   Skin:     General: Skin is warm and dry.      Coloration: Skin is not jaundiced or pale.      Findings: No lesion (Negative for suspicious skin lesions/growths) or rash.      Comments: No jaundice  No suspicious skin lesions.    Neurological:      Mental Status: He is alert and oriented to person, place, and time.      Cranial Nerves: No cranial nerve deficit.      Motor: No abnormal muscle tone.      Deep Tendon Reflexes: Reflexes normal.   Psychiatric:         Mood and Affect: Mood normal.         Behavior: Behavior normal.         Thought Content: Thought content normal.         Judgment: Judgment normal.         REVIEWED DATA      Labs:    Lab Results   Component Value Date     04/01/2021    K 4.3 04/01/2021    CALCIUM 10.0 04/01/2021    AST 19 04/01/2021    ALT 14 04/01/2021    BUN 12 04/01/2021    CREATININE 0.83 04/01/2021    CREATININE 1.00 07/13/2020    CREATININE 0.92 08/30/2019    EGFRIFNONA 102 04/01/2021    EGFRIFAFRI 123 04/01/2021       Lab Results   Component Value Date    GLU 80 04/01/2021    HGBA1C 5.59 04/01/2021    HGBA1C 5.30 07/13/2020    HGBA1C 5.5 09/11/2019    TSH 5.470 (H) 04/01/2021    FREET4 1.25 04/01/2021       Lab Results   Component Value Date    PSA 0.404 04/01/2021    PSA 0.459 08/30/2019       No results found for: TESTOSTERONE, TESTOSTEROTT, TESTFRE  Lab Results   Component Value Date    PSA 0.404 04/01/2021    PSA 0.459 08/30/2019    HCT 41.4 04/01/2021    HCT 40.9 07/13/2020    HCT 42.5 08/30/2019       Lab Results   Component Value Date    LDL 76 04/01/2021    HDL 43 04/01/2021    TRIG 73 04/01/2021    CHOLHDLRATIO 3.12 04/01/2021       No components found for: [id number]    Lab Results   Component Value Date    WBC 3.79 04/01/2021    HGB 14.3  04/01/2021    MCV 89.0 04/01/2021     04/01/2021       Lab Results   Component Value Date    PROTEIN Negative 04/01/2021    GLUCOSEU Negative 04/01/2021    BLOODU See below: (A) 04/01/2021    NITRITEU Negative 04/01/2021    LEUKOCYTESUR Negative 04/01/2021          Lab Results   Component Value Date    HEPCVIRUSABY <0.1 04/01/2021       Imaging:           Medical Tests:           ASSESSMENT & PLAN     ANNUAL WELLNESS EXAM / PHYSICAL     Other medical problems addressed today:  Problem List Items Addressed This Visit        High    Hypothyroidism - Primary (Chronic)    Current Assessment & Plan     Increase levothyroxine to 112 mcg. Recheck TSH and Free T4 in 2 months.          Relevant Medications    levothyroxine (SYNTHROID, LEVOTHROID) 112 MCG tablet    Other Relevant Orders    TSH+Free T4    Thyroid Peroxidase Antibody    Asymptomatic microscopic hematuria    Current Assessment & Plan     Check CT abdomen/pelvis. May need to see urologist.          Relevant Orders    CT Abdomen Pelvis Stone Protocol       Medium    IFG (impaired fasting glucose) (Chronic)    Current Assessment & Plan     A1c for average glucose level is little higher. Maintain a low sugar/starch/carbohydrate diet and continue to exercise regularly.     Lab Results   Component Value Date    HGBA1C 5.59 04/01/2021    HGBA1C 5.30 07/13/2020    HGBA1C 5.5 09/11/2019                Unprioritized    History of kidney stones    Relevant Orders    CT Abdomen Pelvis Stone Protocol    Dry skin dermatitis    Current Assessment & Plan     Avoid washing hands with harsh soap frequently. Use Gold Bond eczema skin lotion.            Other Visit Diagnoses     Encounter for annual health examination              Summary/Discussion:     · Primary reason for today's visit was for annual health examination. However above active medical issues also needed to be addressed today.      Next Appointment with me: Visit date not found    Return in about 4 months  (around 8/8/2021) for Hypothyroidism.      HEALTHCARE MAINTENANCE ISSUES       Cancer Screening:  · Colon: Initial/Next screening at age: 43  · Repeat colon cancer screening: N/A at this time  · Prostate: Performed today and recommended annual screening  · Testicular: Recommended monthly self exam  · Skin: Monthly self skin examination, annual exam by health professional  · Lung: Does not meet criteria for lung cancer screening.   · Other:    Screening Labs & Tests:  · Lab results reviewed & discussed with with patient or orders placed today.  · EKG:  · CV Screening: Lipid panel  · DEXA (75+ or risk factors):   · HEP C (If born [id number] or risk factors): Negative screen  · Other:     Immunization/Vaccinations (to be given today unless deferred by patient)  · Influenza: Patient had the flu shot this season  · Hepatitis A: Recommended here or at pharmacy  · Hepatitis B: Verify immunization records  · Tetanus/Pertussis: Up to date  · Pneumovax: Not needed at this time  · Shingles: Not needed at this time  · COVID: KRISTA LINK COVID: Completed the vaccine series.   Lifestyle Counseling:  · Lifestyle Modifications: Continue good lifestyle choices/modifications and Maintain a low sugar/carbohydrate diet  · Safety Issues: Always wear seatbelt, Avoid texting while driving   · Use sunscreen, regular skin examination  · Recommended annual dental/vision examination.  · Emotional/Stress/Sleep: Reviewed and  given when appropriate      Health Maintenance   Topic Date Due   • COVID-19 Vaccine (2 - Pfizer 2-dose series) 04/06/2021   • INFLUENZA VACCINE  08/01/2021   • ANNUAL PHYSICAL  04/09/2022   • TDAP/TD VACCINES (2 - Td) 09/11/2029   • HEPATITIS C SCREENING  Completed   • Pneumococcal Vaccine 0-64  Aged Out   • MENINGOCOCCAL VACCINE  Aged Out         *Examiner was wearing KN95 mask, face shield and exam gloves during the entire duration of the visit. Patient was masked the entire time.   Minimum social distance of 6 ft  maintained entire visit except if physical contact was necessary as documented.     **Dragon Disclaimer:   Much of this encounter note is an electronic transcription/translation of spoken language to printed text. The electronic translation of spoken language may permit erroneous, or at times, nonsensical words or phrases to be inadvertently transcribed. Although I have reviewed the note for such errors, some may still exist.       Template created by Milton Ayala MD

## 2021-04-19 ENCOUNTER — TELEPHONE (OUTPATIENT)
Dept: INTERNAL MEDICINE | Age: 41
End: 2021-04-19

## 2021-04-22 ENCOUNTER — TELEPHONE (OUTPATIENT)
Dept: INTERNAL MEDICINE | Age: 41
End: 2021-04-22

## 2021-04-22 NOTE — TELEPHONE ENCOUNTER
Dinora at [facility] centralized scheduling called. They are taking care of the pre-cert for his CT scan, however the patients insurance has a call out to the patient and waiting to hear back from the patient. If they don't hear back by today at 3pm, they will have to reschedule the CT from tomorrow to another date.   Is this ok with you?    They need to hear back from our office before 3pm

## 2021-04-22 NOTE — TELEPHONE ENCOUNTER
Called pt back   He is scheduled with [facility] imaging     Pt will call bobbi hernandez and cancel chandler

## 2021-04-23 ENCOUNTER — APPOINTMENT (OUTPATIENT)
Dept: CT IMAGING | Facility: HOSPITAL | Age: 41
End: 2021-04-23

## 2021-04-28 NOTE — PROGRESS NOTES
Call patient with his test result(s) and mail the results to him if MyChart is NOT active.    CT of abdomen/pelvis shows 8 mm kidney stone. I suspect recent blood in urine may be related to the stone or a smaller stone that may have passed. There is no evidence of obstruction or hydronephrosis. I would suggest seeing a urologist. Send order to me for authorization.       Incidental finding of gallstone in gallbladder. Watch for RUQ abdominal pain associated with meals.

## 2021-04-29 DIAGNOSIS — Z87.442 HISTORY OF KIDNEY STONES: Primary | ICD-10-CM

## 2021-06-18 DIAGNOSIS — E03.9 HYPOTHYROIDISM, UNSPECIFIED TYPE: Chronic | ICD-10-CM

## 2021-06-18 RX ORDER — LEVOTHYROXINE SODIUM 137 UG/1
137 TABLET ORAL
Qty: 30 TABLET | Refills: 3 | Status: SHIPPED | OUTPATIENT
Start: 2021-06-18 | End: 2021-08-12 | Stop reason: SDUPTHER

## 2021-06-18 NOTE — ASSESSMENT & PLAN NOTE
Increase levothyroxine to 137 mcg. Recheck in 2 months.     Lab Results   Component Value Date    TSH 4.500 (H) 06/16/2021    TSH 5.470 (H) 04/01/2021    TSH 0.640 07/13/2020    FREET4 1.25 06/16/2021    FREET4 1.25 04/01/2021    FREET4 1.26 07/13/2020

## 2021-08-11 ENCOUNTER — OFFICE VISIT (OUTPATIENT)
Dept: INTERNAL MEDICINE | Age: 41
End: 2021-08-11

## 2021-08-11 VITALS
HEART RATE: 65 BPM | BODY MASS INDEX: 22.28 KG/M2 | SYSTOLIC BLOOD PRESSURE: 100 MMHG | DIASTOLIC BLOOD PRESSURE: 60 MMHG | HEIGHT: 68 IN | WEIGHT: 147 LBS | TEMPERATURE: 98.2 F | OXYGEN SATURATION: 98 %

## 2021-08-11 DIAGNOSIS — E03.9 HYPOTHYROIDISM, UNSPECIFIED TYPE: Primary | Chronic | ICD-10-CM

## 2021-08-11 DIAGNOSIS — R73.01 IFG (IMPAIRED FASTING GLUCOSE): Chronic | ICD-10-CM

## 2021-08-11 DIAGNOSIS — Z87.442 HISTORY OF KIDNEY STONES: ICD-10-CM

## 2021-08-11 PROCEDURE — 99214 OFFICE O/P EST MOD 30 MIN: CPT | Performed by: INTERNAL MEDICINE

## 2021-08-11 NOTE — PATIENT INSTRUCTIONS
** IMPORTANT MESSAGE FROM DR. MARTINEZ **    In our office, your satisfaction is VERY important to us.     You may receive a survey from [facility] by mail or E-mail for you to provide feedback about your visit. This information is invaluable for me to know what we can do to improve our services.     I ask that you please take a few minutes to complete the survey and let us know how we are doing in serving your needs. (You may receive the survey more than once for multiple visits)    Thank You !    Dr. Martinez & Staff    _________________________________________________________________________________________________________________________      ** ADDITIONAL INSTRUCTION / REMINDERS FROM DR. MARTINEZ **

## 2021-08-11 NOTE — PROGRESS NOTES
"    I N T E R N A L  M E D I C I N E  J U N O H  K I M  M D      ENCOUNTER DATE:  08/11/2021    Brayan Martins / 41 y.o. / male      CHIEF COMPLAINT / REASON FOR OFFICE VISIT     Hypothyroidism      ASSESSMENT & PLAN     Problem List Items Addressed This Visit        High    Hypothyroidism - Primary (Chronic)    Overview     - TPO antibody negative         Current Assessment & Plan     Check TSH and Free T4 today. Currently on levothyroxine 137 mcg.         Relevant Medications    levothyroxine (SYNTHROID, LEVOTHROID) 137 MCG tablet    Other Relevant Orders    TSH+Free T4       Low    IFG (impaired fasting glucose) (Chronic)    Current Assessment & Plan     Maintain a low sugar/starch/carbohydrate diet and exercise regularly.    Lab Results   Component Value Date    GLU 80 04/01/2021    GLU 82 07/13/2020     (H) 08/30/2019     Lab Results   Component Value Date    HGBA1C 5.59 04/01/2021    HGBA1C 5.30 07/13/2020    HGBA1C 5.5 09/11/2019                Unprioritized    History of kidney stones    Overview     -History of stone 2008 passed spontaneously  -S/p lithotripsy 5/2021 ([facility])             Orders Placed This Encounter   Procedures   • TSH+Free T4     No orders of the defined types were placed in this encounter.      SUMMARY/DISCUSSION  •       Next Appointment with me: Visit date not found    Return in about 8 months (around 4/11/2022) for COMBINED annual physical & chronic medical.      VITAL SIGNS     Visit Vitals  /60 (BP Location: Left arm)   Pulse 65   Temp 98.2 °F (36.8 °C)   Ht 172.7 cm (67.99\")   Wt 66.7 kg (147 lb)   SpO2 98%   BMI 22.36 kg/m²       BP Readings from Last 3 Encounters:   08/11/21 100/60   04/08/21 120/70   10/07/20 110/60     Wt Readings from Last 3 Encounters:   08/11/21 66.7 kg (147 lb)   04/08/21 66.7 kg (147 lb)   10/07/20 64.9 kg (143 lb)     Body mass index is 22.36 kg/m².      MEDICATIONS AT THE TIME OF OFFICE VISIT     Current Outpatient Medications on File " Prior to Visit   Medication Sig   • levothyroxine (SYNTHROID, LEVOTHROID) 137 MCG tablet Take 1 tablet by mouth Every Morning Before Breakfast.     No current facility-administered medications on file prior to visit.          HISTORY OF PRESENT ILLNESS     Treated for recurrent kidney stone with lithotripsy. Denies recurrent hematuria or pain.   Previously increased levothyroxine to 137 mcg. TPO antibody negative.   IFG without diabetes. Wt remains stable. Watching diet.       Patient Care Team:  Kyrie Ayala MD as PCP - General (Internal Medicine)  Yogesh Keen MD as Consulting Physician (Urology)    REVIEW OF SYSTEMS     Review of Systems   Constitutional neg except per HPI   Resp neg  CV neg   GI neg    neg     PHYSICAL EXAMINATION     Physical Exam  General: No acute distress, alert with normal judgment     REVIEWED DATA     Labs:     Lab Results   Component Value Date     04/01/2021    K 4.3 04/01/2021    CALCIUM 10.0 04/01/2021    AST 19 04/01/2021    ALT 14 04/01/2021    BUN 12 04/01/2021    CREATININE 0.83 04/01/2021    CREATININE 1.00 07/13/2020    CREATININE 0.92 08/30/2019    EGFRIFNONA 102 04/01/2021    EGFRIFAFRI 123 04/01/2021     Lab Results   Component Value Date    CALCIUM 10.0 04/01/2021    CALCIUM 9.9 07/13/2020    CALCIUM 9.5 08/30/2019     No results found for: PTH   Lab Results   Component Value Date    HGBA1C 5.59 04/01/2021    HGBA1C 5.30 07/13/2020    HGBA1C 5.5 09/11/2019       Lab Results   Component Value Date    LDL 76 04/01/2021    LDL 97 08/30/2019    HDL 43 04/01/2021    TRIG 73 04/01/2021       Lab Results   Component Value Date    TSH 4.500 (H) 06/16/2021    TSH 5.470 (H) 04/01/2021    TSH 0.640 07/13/2020    FREET4 1.25 06/16/2021    FREET4 1.25 04/01/2021    FREET4 1.26 07/13/2020       Lab Results   Component Value Date    WBC 3.79 04/01/2021    HGB 14.3 04/01/2021     04/01/2021         Imaging:     CT abdomen/pelvis: kidney stone and gallstone without  cholecystitis       Medical Tests:           Summary of old records / correspondence / consultant report:           Request outside records:             *Examiner was wearing KN95 mask and eye protection during the entire duration of the visit. Patient was masked the entire time.   Minimum social distance of 6 ft maintained entire visit except if physical contact was necessary as documented.     **Dragon Disclaimer:   Much of this encounter note is an electronic transcription/translation of spoken language to printed text. The electronic translation of spoken language may permit erroneous, or at times, nonsensical words or phrases to be inadvertently transcribed. Although I have reviewed the note for such errors, some may still exist.       Template created by Milton Ayala MD

## 2021-08-11 NOTE — ASSESSMENT & PLAN NOTE
Maintain a low sugar/starch/carbohydrate diet and exercise regularly.    Lab Results   Component Value Date    GLU 80 04/01/2021    GLU 82 07/13/2020     (H) 08/30/2019     Lab Results   Component Value Date    HGBA1C 5.59 04/01/2021    HGBA1C 5.30 07/13/2020    HGBA1C 5.5 09/11/2019

## 2021-08-12 DIAGNOSIS — E03.9 HYPOTHYROIDISM, UNSPECIFIED TYPE: Chronic | ICD-10-CM

## 2021-08-12 LAB
T4 FREE SERPL-MCNC: 1.69 NG/DL (ref 0.93–1.7)
TSH SERPL DL<=0.005 MIU/L-ACNC: 0.05 UIU/ML (ref 0.27–4.2)

## 2021-08-12 RX ORDER — LEVOTHYROXINE SODIUM 0.12 MG/1
125 TABLET ORAL
Qty: 30 TABLET | Refills: 3 | Status: SHIPPED | OUTPATIENT
Start: 2021-08-12 | End: 2021-09-24 | Stop reason: SDUPTHER

## 2021-08-12 NOTE — ASSESSMENT & PLAN NOTE
Lab Results   Component Value Date    TSH 0.046 (L) 08/11/2021    TSH 4.500 (H) 06/16/2021    TSH 5.470 (H) 04/01/2021    FREET4 1.69 08/11/2021    FREET4 1.25 06/16/2021    FREET4 1.25 04/01/2021       Decrease levothyroxine to 125 mcg. Recheck in 6 weeks.

## 2021-08-12 NOTE — PROGRESS NOTES
Call patient with results:    Need to decrease levothyroxine to 125 mcg every morning. Verify taking 136 mcg. Recheck TFT in 6 weeks.   - Instruct patient on proper administration of thyroid medication. Do not take medication within 4 hours of thyroid labs.

## 2021-09-24 DIAGNOSIS — E03.9 HYPOTHYROIDISM, UNSPECIFIED TYPE: Chronic | ICD-10-CM

## 2021-09-24 RX ORDER — LEVOTHYROXINE SODIUM 0.1 MG/1
100 TABLET ORAL
Qty: 30 TABLET | Refills: 3 | Status: SHIPPED | OUTPATIENT
Start: 2021-09-24 | End: 2022-01-28 | Stop reason: SDUPTHER

## 2021-09-24 NOTE — ASSESSMENT & PLAN NOTE
Decrease levothyroxine to 100 mcg. Recheck 6 weeks.   Lab Results   Component Value Date    TSH 0.011 (L) 09/23/2021    TSH 0.046 (L) 08/11/2021    TSH 4.500 (H) 06/16/2021    FREET4 1.95 (H) 09/23/2021    FREET4 1.69 08/11/2021    FREET4 1.25 06/16/2021

## 2021-11-16 ENCOUNTER — IMMUNIZATION (OUTPATIENT)
Dept: VACCINE CLINIC | Facility: HOSPITAL | Age: 41
End: 2021-11-16

## 2021-11-16 PROCEDURE — 0004A ADM SARSCOV2 30MCG/0.3ML BOOSTER: CPT | Performed by: INTERNAL MEDICINE

## 2021-11-16 PROCEDURE — 91300 HC SARSCOV02 VAC 30MCG/0.3ML IM: CPT | Performed by: INTERNAL MEDICINE

## 2022-01-17 ENCOUNTER — TELEPHONE (OUTPATIENT)
Dept: INTERNAL MEDICINE | Age: 42
End: 2022-01-17

## 2022-01-17 NOTE — TELEPHONE ENCOUNTER
Caller: Brayan Martins    Relationship to patient: Self    Best call back number: [phone]    Patient is needing: PLEASE CALL PATIENT, HE HAS BEEN OUT OF THE COUNTRY AND HAS QUESTIONS

## 2022-01-20 ENCOUNTER — TELEPHONE (OUTPATIENT)
Dept: INTERNAL MEDICINE | Age: 42
End: 2022-01-20

## 2022-01-20 NOTE — TELEPHONE ENCOUNTER
Hub staff attempted to follow warm transfer process and was unsuccessful     Caller: Brayan Martins    Relationship to patient: Self    Best call back number: [phone]    Patient is needing: PATIENT IS WANTING TO RESCHEDULE LABS THAT HE HAS FOR TOMORROW 1-21-22, PLEASE ADVISE.

## 2022-01-28 DIAGNOSIS — E03.9 HYPOTHYROIDISM, UNSPECIFIED TYPE: Chronic | ICD-10-CM

## 2022-01-28 RX ORDER — LEVOTHYROXINE SODIUM 0.1 MG/1
100 TABLET ORAL
Qty: 30 TABLET | Refills: 3 | Status: SHIPPED | OUTPATIENT
Start: 2022-01-28 | End: 2022-03-07 | Stop reason: SDUPTHER

## 2022-03-02 DIAGNOSIS — E03.9 HYPOTHYROIDISM, UNSPECIFIED TYPE: Chronic | ICD-10-CM

## 2022-03-02 NOTE — TELEPHONE ENCOUNTER
Patient phone: [phone] - confirmed that he has been taking his medication regularly. Levothyroxine 150 mcg once daily in AM. (I called [facility]  pharmacy ph: [phone] and verified his last script. They filled Levothyroxine 100 mcg)     Jan - patient ran out of medication.  When he had his lab work done in January. He had not taken medication for a few days.    Feb - patient has taken medication regularly and continuously, 7:30 - 8:00 AM every morning.

## 2022-03-02 NOTE — TELEPHONE ENCOUNTER
Call patient:    Decrease levothyroxine to 75 mcg qAM. Recheck TSH and Free T4 on labs for upcoming CPE in April.   Send order(s) to me for authorization for change in thyroid dose.

## 2022-03-07 RX ORDER — LEVOTHYROXINE SODIUM 0.07 MG/1
75 TABLET ORAL
Qty: 30 TABLET | Refills: 1 | Status: SHIPPED | OUTPATIENT
Start: 2022-03-07 | End: 2022-04-20 | Stop reason: SDUPTHER

## 2022-03-09 ENCOUNTER — TELEPHONE (OUTPATIENT)
Dept: INTERNAL MEDICINE | Age: 42
End: 2022-03-09

## 2022-03-09 NOTE — TELEPHONE ENCOUNTER
Caller: Brayan Martins    Relationship: Self    Best call back number:[phone]   What is the best time to reach you: BETWEEN 1-2 PM  Who are you requesting to speak with (clinical staff, provider,  specific staff member): CLINICAL     Do you know the name of the person who called: VIDHYA     Do you require a callback: YES

## 2022-03-29 DIAGNOSIS — Z00.00 PREVENTATIVE HEALTH CARE: Primary | ICD-10-CM

## 2022-03-29 DIAGNOSIS — Z12.5 ENCOUNTER FOR SCREENING FOR MALIGNANT NEOPLASM OF PROSTATE: ICD-10-CM

## 2022-04-11 ENCOUNTER — TELEPHONE (OUTPATIENT)
Dept: INTERNAL MEDICINE | Age: 42
End: 2022-04-11

## 2022-04-11 NOTE — TELEPHONE ENCOUNTER
PATIENT WOULD LIKE DR. MARTINEZ'S MA TO GIVE HIM A CALL BACK IN THE NEXT 15 MINUTES IF POSSIBLE.  HE JUST HAS A FEW QUESTION'S REGARDING HIS PHYSICAL TOMORROW @ [phone].

## 2022-04-12 ENCOUNTER — OFFICE VISIT (OUTPATIENT)
Dept: INTERNAL MEDICINE | Age: 42
End: 2022-04-12

## 2022-04-12 VITALS
OXYGEN SATURATION: 99 % | HEART RATE: 62 BPM | SYSTOLIC BLOOD PRESSURE: 92 MMHG | WEIGHT: 162 LBS | HEIGHT: 68 IN | DIASTOLIC BLOOD PRESSURE: 60 MMHG | TEMPERATURE: 97.3 F | BODY MASS INDEX: 24.55 KG/M2

## 2022-04-12 DIAGNOSIS — Z00.00 ENCOUNTER FOR ANNUAL HEALTH EXAMINATION: Primary | ICD-10-CM

## 2022-04-12 DIAGNOSIS — E03.9 HYPOTHYROIDISM, UNSPECIFIED TYPE: ICD-10-CM

## 2022-04-12 DIAGNOSIS — R63.5 ABNORMAL WEIGHT GAIN: ICD-10-CM

## 2022-04-12 PROCEDURE — 99396 PREV VISIT EST AGE 40-64: CPT | Performed by: INTERNAL MEDICINE

## 2022-04-12 PROCEDURE — 99213 OFFICE O/P EST LOW 20 MIN: CPT | Performed by: INTERNAL MEDICINE

## 2022-04-12 NOTE — PATIENT INSTRUCTIONS
** IMPORTANT MESSAGE FROM DR. MARTINEZ **    In our office, your satisfaction is VERY important to us.     You may receive a survey from [facility] by mail or E-mail for you to provide feedback about your visit. This information is invaluable for me to know what we can do to improve our services.     I ask that you please take a few minutes to complete the survey and let us know how we are doing in serving your needs. (You may receive the survey more than once for multiple visits)    Thank You !    Dr. Martniez    _________________________________________________________________________________________________________________________      ** ADDITIONAL INSTRUCTION / REMINDERS FROM DR. MARTINEZ **

## 2022-04-12 NOTE — PROGRESS NOTES
"    I N T E R N A L  M E D I C I N E  J U N O H  K I M,  M D      ENCOUNTER DATE:  04/12/2022    Brayan Eliezer / 41 y.o. / male    CHIEF COMPLAINT     Annual Exam (4/8/21) and chronic medical problems      VITALS     Visit Vitals  BP 92/60 (BP Location: Left arm)   Pulse 62   Temp 97.3 °F (36.3 °C)   Ht 172.7 cm (67.99\")   Wt 73.5 kg (162 lb)   SpO2 99%   BMI 24.64 kg/m²       BP Readings from Last 3 Encounters:   04/12/22 92/60   08/11/21 100/60   04/08/21 120/70     Wt Readings from Last 3 Encounters:   04/12/22 73.5 kg (162 lb)   08/11/21 66.7 kg (147 lb)   04/08/21 66.7 kg (147 lb)      Body mass index is 24.64 kg/m².    Blood pressure readings recorded on patient flowsheet:  No flowsheet data found.     MEDICATIONS     Current Outpatient Medications on File Prior to Visit   Medication Sig Dispense Refill   • levothyroxine (SYNTHROID, LEVOTHROID) 75 MCG tablet Take 1 tablet by mouth Every Morning. 30 tablet 1     No current facility-administered medications on file prior to visit.         HISTORY OF PRESENT ILLNESS      Brayan presents for annual health maintenance visit and follow-up on his hypothyroidism.  Since August 2021 his dose of levothyroxine had to be decreased due to low levels of TSH and high free T4.  Since then his weight has been trending upwards.  He has gained 15 pounds same time last year.  He denies significant change in activity level although he has not been able to play tennis over the winter as he usually does during other months.  He reports compliance with thyroid medication most recently.  Previously when his TSH was greater than 6 he had missed 1 week of medication.  He is currently taking levothyroxine 75 mcg.  TPO antibody test was previously negative.  He denies family history of thyroid disease.  He denies any constipation, cold intolerance, diarrhea, heart palpitations or anxiety.    · General health: some medical problems  · Lifestyle:  · Attempting to lose weight?: Yes   · Diet: " eats a well balanced, healthy diet  · Exercise: has not been as active recently  · Tobacco: Never used   · Alcohol: occasional/infrequent  · Work: Full-time  · Reproductive health:  · Sexually active?: Yes   · Concern for STD?: No   · Sexual problems?: No problems   · Sees Urologist?: No   · Depression Screening:      PHQ-2/PHQ-9 Depression Screening 4/12/2022   Retired PHQ-9 Total Score -   Retired Total Score -   Little Interest or Pleasure in Doing Things 0-->not at all   Feeling Down, Depressed or Hopeless 0-->not at all   PHQ-9: Brief Depression Severity Measure Score 0         PHQ-2: 0 (Not depressed)     PHQ-9: 0 (Negative screening for depression)    Patient Care Team:  Kyrie Ayala MD as PCP - General (Internal Medicine)  Yogesh Keen MD as Consulting Physician (Urology)  ______________________________________________________________________    ALLERGIES  No Known Allergies     PFSH:     The following portions of the patient's history were reviewed and updated as appropriate: Allergies / Current Medications / Past Medical History / Surgical History / Social History / Family History    PROBLEM LIST   Patient Active Problem List   Diagnosis   • Hypothyroidism   • Vitamin D deficiency   • IFG (impaired fasting glucose)   • History of kidney stones   • Dry skin dermatitis       PAST MEDICAL HISTORY  Past Medical History:   Diagnosis Date   • Hypothyroidism    • Kidney stone 2008    passed       SURGICAL HISTORY  Past Surgical History:   Procedure Laterality Date   • CIRCUMCISION  2009   • CYSTOSCOPY W/ LASER LITHOTRIPSY Right 05/2021       SOCIAL HISTORY  Social History     Socioeconomic History   • Marital status:      Spouse name: Mikki   • Number of children: 2   Tobacco Use   • Smoking status: Never Smoker   • Smokeless tobacco: Never Used   Substance and Sexual Activity   • Alcohol use: Not Currently     Comment: occasional   • Drug use: No   • Sexual activity: Yes     Partners: Female      Birth control/protection: Surgical       FAMILY HISTORY  Family History   Problem Relation Age of Onset   • No Known Problems Mother    • Hypertension Father    • Coronary artery disease Father 50        s/p cabg   • No Known Problems Sister    • No Known Problems Daughter    • No Known Problems Son    • Diabetes type II Maternal Uncle    • Coronary artery disease Paternal Aunt    • Coronary artery disease Paternal Uncle    • No Known Problems Maternal Grandfather    • No Known Problems Paternal Grandmother          82   • Hypertension Paternal Grandfather          85   • Diabetes Neg Hx    • Thyroid disease Neg Hx    • Cancer Neg Hx    • Colon polyps Neg Hx    • Prostate cancer Neg Hx    • Nephrolithiasis Neg Hx        IMMUNIZATION HISTORY  Immunization History   Administered Date(s) Administered   • COVID-19 (PFIZER) PURPLE CAP 2021, 2021, 2021   • Flu Vaccine Split Quad 10/07/2020   • FluLaval/Fluarix/Fluzone >6 10/07/2020   • Influenza, Unspecified 2019   • MMR 2021   • Tdap 2019         REVIEW OF SYSTEMS     Review of Systems   Constitutional: Negative.  Negative for fatigue. Unexpected weight change: weight gain.   HENT: Negative.    Eyes: Negative.    Respiratory: Negative.    Cardiovascular: Negative.    Gastrointestinal: Negative.    Endocrine: Negative.    Genitourinary: Negative.    Musculoskeletal: Negative.    Skin: Negative.    Allergic/Immunologic: Negative.    Neurological: Negative.    Hematological: Negative.    Psychiatric/Behavioral: Negative.          PHYSICAL EXAMINATION     Physical Exam  Constitutional:       General: He is not in acute distress.     Appearance: He is well-developed.      Comments: Overweight    HENT:      Head: Normocephalic and atraumatic.      Right Ear: Tympanic membrane, ear canal and external ear normal.      Left Ear: Tympanic membrane, ear canal and external ear normal.   Eyes:      General: No scleral icterus.      Conjunctiva/sclera: Conjunctivae normal.      Pupils: Pupils are equal, round, and reactive to light.   Neck:      Thyroid: No thyroid mass or thyromegaly.      Vascular: No carotid bruit.      Trachea: No tracheal deviation.   Cardiovascular:      Rate and Rhythm: Normal rate and regular rhythm.      Pulses: Normal pulses.      Heart sounds: Normal heart sounds.      Comments: No carotid bruit  Pulmonary:      Effort: Pulmonary effort is normal.      Breath sounds: Normal breath sounds.   Chest:   Breasts:      Right: No supraclavicular adenopathy.      Left: No supraclavicular adenopathy.       Abdominal:      General: There is no distension.      Palpations: Abdomen is soft. There is no mass.      Tenderness: There is no abdominal tenderness.      Hernia: No hernia is present.   Musculoskeletal:      Cervical back: Neck supple.      Right lower leg: No edema.      Left lower leg: No edema.   Lymphadenopathy:      Cervical: No cervical adenopathy.      Upper Body:      Right upper body: No supraclavicular adenopathy.      Left upper body: No supraclavicular adenopathy.   Skin:     General: Skin is warm.      Coloration: Skin is not jaundiced or pale.      Findings: No lesion (Negative for suspicious skin lesions/growths) or rash.      Comments: No jaundice  No suspicious skin lesions.    Neurological:      Mental Status: He is alert and oriented to person, place, and time.      Cranial Nerves: No cranial nerve deficit.      Motor: No abnormal muscle tone.   Psychiatric:         Mood and Affect: Mood normal.         Behavior: Behavior normal.         Thought Content: Thought content normal.         Judgment: Judgment normal.         REVIEWED DATA      Labs:    Lab Results   Component Value Date     04/01/2021    K 4.3 04/01/2021    CALCIUM 10.0 04/01/2021    AST 19 04/01/2021    ALT 14 04/01/2021    BUN 12 04/01/2021    CREATININE 0.83 04/01/2021    CREATININE 1.00 07/13/2020    CREATININE 0.92 08/30/2019     EGFRIFNONA 102 04/01/2021    EGFRIFAFRI 123 04/01/2021       Lab Results   Component Value Date    GLUCOSE 80 04/01/2021    HGBA1C 5.59 04/01/2021    HGBA1C 5.30 07/13/2020    HGBA1C 5.5 09/11/2019    TSH 0.057 (L) 03/01/2022    FREET4 2.14 (H) 03/01/2022     TPO antibody negative previously     Lab Results   Component Value Date    PSA 0.404 04/01/2021    PSA 0.459 08/30/2019       No results found for: TESTOSTERONE, TESTOSTEROTT, TESTFRE    Lab Results   Component Value Date    LDL 76 04/01/2021    HDL 43 04/01/2021    TRIG 73 04/01/2021    CHOLHDLRATIO 3.12 04/01/2021       No components found for: [id number]    Lab Results   Component Value Date    WBC 3.79 04/01/2021    HGB 14.3 04/01/2021    MCV 89.0 04/01/2021     04/01/2021       Lab Results   Component Value Date    PROTEIN Negative 04/01/2021    GLUCOSEU Negative 04/01/2021    BLOODU See below: (A) 04/01/2021    NITRITEU Negative 04/01/2021    LEUKOCYTESUR Negative 04/01/2021          Lab Results   Component Value Date    HEPCVIRUSABY <0.1 04/01/2021       Imaging:           Medical Tests:           ASSESSMENT & PLAN     ANNUAL WELLNESS EXAM / PHYSICAL     Other medical problems addressed today:  Problem List Items Addressed This Visit        High    Hypothyroidism (Chronic)    Overview     - TPO antibody negative           Current Assessment & Plan     Check thyroid labs today (as part of his physical labs that were not done yet).   Currently on levothyroxine 75 mcg. Will likely need to switch to name brand Synthroid. Will adjust dose as needed pending results. I placed a referral to Endocrinologist based on his request.     Lab Results   Component Value Date    TSH 0.057 (L) 03/01/2022    TSH 6.280 (H) 01/25/2022    TSH 0.011 (L) 09/23/2021    FREET4 2.14 (H) 03/01/2022    FREET4 1.31 01/25/2022    FREET4 1.95 (H) 09/23/2021               Relevant Medications    levothyroxine (SYNTHROID, LEVOTHROID) 75 MCG tablet    Other Relevant Orders     Ambulatory Referral to Endocrinology      Other Visit Diagnoses     Encounter for annual health examination    -  Primary    Abnormal weight gain              Summary/Discussion:     ·     Next Appointment with me: Visit date not found    Return in about 6 months (around 10/12/2022) for Reassess chronic medical problems.      HEALTHCARE MAINTENANCE ISSUES       Cancer Screening:  · Colon: Initial/Next screening at age: 43  · Repeat colon cancer screening: N/A at this time  · Prostate: Performed today and recommended annual screening  · Testicular: Recommended monthly self exam  · Skin: Monthly self skin examination, annual exam by health professional  · Lung: Does not meet criteria for lung cancer screening.   · Other:    Screening Labs & Tests:  · Lab results reviewed & discussed with with patient or orders placed today.  · EKG:  · CV Screening: Lipid panel  · DEXA (75+ or risk factors):   · HEP C (If born [birth year]61[birth year] or risk factors): Previously had negative screen  · Other:     Immunization/Vaccinations (to be given today unless deferred by patient)  · Influenza: Recommended annual influenza vaccine  · Hepatitis A: Recommended here or at pharmacy  · Hepatitis B: Not needed at this time  · Tetanus/Pertussis: Up to date  · Pneumovax: Not needed at this time  · Shingles: Not needed at this time  · COVID: KRISTA LINK COVID: Completed primary vaccine series and booster  Lifestyle Counseling:  · Lifestyle Modifications: Attempt to lose weight, Maintain a low sugar/carbohydrate diet, Follow a low fat, low cholesterol diet, Make dinner the lightest meal of day and Discussed sexual issues, safe sex practices, contraception  · Safety Issues: Always wear seatbelt, Avoid texting while driving   · Use sunscreen, regular skin examination  · Recommended annual dental/vision examination.  · Emotional/Stress/Sleep: Reviewed and  given when appropriate      Health Maintenance   Topic Date Due   • INFLUENZA VACCINE  08/01/2022   •  ANNUAL PHYSICAL  04/13/2023   • TDAP/TD VACCINES (2 - Td or Tdap) 09/11/2029   • HEPATITIS C SCREENING  Completed   • COVID-19 Vaccine  Completed   • Pneumococcal Vaccine 0-64  Aged Out           *Examiner was wearing KN95 mask and eye protection during the entire duration of the visit. Patient was masked the entire time. Minimum social distance of 6 ft maintained entire visit except if physical contact was necessary as documented.       Template created by Milton Ayala MD

## 2022-04-12 NOTE — ASSESSMENT & PLAN NOTE
Check thyroid labs today (as part of his physical labs that were not done yet).   Currently on levothyroxine 75 mcg. Will likely need to switch to name brand Synthroid. Will adjust dose as needed pending results. I placed a referral to Endocrinologist based on his request.     Lab Results   Component Value Date    TSH 0.057 (L) 03/01/2022    TSH 6.280 (H) 01/25/2022    TSH 0.011 (L) 09/23/2021    FREET4 2.14 (H) 03/01/2022    FREET4 1.31 01/25/2022    FREET4 1.95 (H) 09/23/2021

## 2022-04-13 PROBLEM — Z87.442 HISTORY OF KIDNEY STONES: Status: RESOLVED | Noted: 2021-04-08 | Resolved: 2022-04-13

## 2022-04-13 PROBLEM — R73.01 IFG (IMPAIRED FASTING GLUCOSE): Chronic | Status: RESOLVED | Noted: 2019-09-11 | Resolved: 2022-04-13

## 2022-04-20 DIAGNOSIS — E03.9 HYPOTHYROIDISM, UNSPECIFIED TYPE: Primary | ICD-10-CM

## 2022-04-20 DIAGNOSIS — E03.9 HYPOTHYROIDISM, UNSPECIFIED TYPE: Chronic | ICD-10-CM

## 2022-04-20 RX ORDER — LEVOTHYROXINE SODIUM 0.07 MG/1
75 TABLET ORAL
Qty: 30 TABLET | Refills: 3 | Status: SHIPPED | OUTPATIENT
Start: 2022-04-20 | End: 2022-05-23 | Stop reason: SDUPTHER

## 2022-04-20 NOTE — TELEPHONE ENCOUNTER
----- Message from Kyrie Ayala MD sent at 4/20/2022  6:32 AM EDT -----  Regarding: FW: Question regarding LIPID PANEL W/ CHOL/HDL RATIO      ----- Message -----  From: Halle Scruggs MA  Sent: 4/19/2022   8:29 AM EDT  To: Kyrie Ayala MD  Subject: FW: Question regarding LIPID PANEL W/ CHOL/H#      ----- Message -----  From: Brayan Martins  Sent: 4/19/2022   6:34 AM EDT  To: Gregorio Haro [id number]  Subject: Question regarding LIPID PANEL W/ CHOL/HDL R#    Hi   I did not get any call in my test test results especially on my thyroid levels ,if I need to continue with my current dosage .I also see my LDL values are out if range and if anything need to be done about it .Please have nurse assistant call me today to discuss on test results including on thyroid and HDL     Thanks   Brayan

## 2022-05-03 NOTE — PROGRESS NOTES
Brayan Martins, 42 y.o., Gender: male    Assessment/Plan    1.  Pt w/ hypothyroidism secondary to unknown cause.  TSH now at goal as of most recent check after fluctuating for the past yr on various doses.  Counseled that the goal of tx will be to keep TSH 0.6-2.6 uIU/mL, which is the rec range for someone on tx at this age.  Rec cont current tx w/ 75 mcg daily.  After a year of mxl doses and varying TSH with most recent being at goal, will cont w/ current dose and recheck to see if this remains stable.  If not and another dose change would be indicated after next labs, will then do trial of branded medication to see if this is an issue with generic and absorption of the medication.  Pt does voice he takes correctly so that would be the only issue that would explain what has happened.   Counseled pt about dose and to have f/u tft's to show being at goal range will then proceed from there w/ other f/u if needed to optimize dose.    2.  Counseled in detail proper way to take thyroid replacement treatment to be as follows.  Pt is to take first thing in the morning on an empty stomach with a large glass of water ONLY. No milk or any juice product. Do not take any other medications or eat anything for 45-60 minutes after taking the medication. You can eat, drink, and take other medications after 45-60 minutes.  Do not take any supplements for at least 3 hours after taking the medication.  Calcium and iron should be taken later in day if possible.  Also, medications to treat excess gastric acid should be taken later in the day.  3.  Note pt w/ hx of Vit D def w/o f/u lab in many yrs.  Will recheck prior to return.       Time Counseled: 20  Minutes  Total Time: 30  Minutes    Return to office in:   2-3  Months      HPI Summary    Pt here for evaluation of hypothyroidism.  Pt reports dx around '11 and was on 100 mcg for some time till in '20 pt lost wgt about 10-15 lbs and dose was adjusted.  From there pt had mxl dose changes  over the past year ranging from 88 mcg up to 137 mcg then down to current 75 mcg.  Pt reports wgt slightly up about 5 lbs in '22 otherwise that has been mostly the same during this period.  Pt questions why so many dose changes as his main concern.  Pt reports some inc hair loss and fair sleep related to work stress as only c/o.  At this time, pt denies any heat/cold intolerance, excessive sweating, bowel problems, palpitations, tremor, anxiety, fatigue, muscle weakness, muscle cramps, joint pain, mental slowness, edema, skin problems, dysphagia, change in voice, or shortness of breath when lying down.  Pt denies history of radiation exposure to head or neck prior to age of 19 yo.  Pt without any other complaints related to thyroid at this time.    Review of Systems  see HPI    Patient History    Past History (reviewed):    Medical:   Past Medical History:   Diagnosis Date   • Hypothyroidism 2011   • Impaired glucose metabolism    • Kidney stone 2008    passed   • Vitamin D deficiency        Surgery:   Past Surgical History:   Procedure Laterality Date   • CIRCUMCISION  2009   • CYSTOSCOPY W/ LASER LITHOTRIPSY Right 05/2021         Social History (reviewed):  - Smoking, occ ETOH, - Drugs, , Occupation [employer]    Medication List:  Current medications were reviewed.    Allergies:  No Known Allergies    Physical Exam    VITALS:    Vitals:    05/23/22 0803   BP: 110/64   Pulse: 58   Temp: 97.5 °F (36.4 °C)   SpO2: 98%     Body mass index is 23.48 kg/m².    GENERAL:  Looks stated age, Well developed  HEAD/EYES:  N/C, A/T, Mask in place  EXTREMITIES:  FROM  CNS:  A&Ox3    Full exam not done today      Labs/Imaging  All available lab and imaging data were reviewed.      Saul Mas MD, WOLFGANG, FACE, ECNU  5/23/2022  08:33 EDT

## 2022-05-23 ENCOUNTER — OFFICE VISIT (OUTPATIENT)
Dept: ENDOCRINOLOGY | Age: 42
End: 2022-05-23

## 2022-05-23 VITALS
HEIGHT: 69 IN | BODY MASS INDEX: 23.55 KG/M2 | TEMPERATURE: 97.5 F | WEIGHT: 159 LBS | DIASTOLIC BLOOD PRESSURE: 64 MMHG | SYSTOLIC BLOOD PRESSURE: 110 MMHG | HEART RATE: 58 BPM | OXYGEN SATURATION: 98 %

## 2022-05-23 DIAGNOSIS — E03.9 HYPOTHYROIDISM, UNSPECIFIED TYPE: Primary | Chronic | ICD-10-CM

## 2022-05-23 DIAGNOSIS — E55.9 VITAMIN D DEFICIENCY: ICD-10-CM

## 2022-05-23 PROCEDURE — 99203 OFFICE O/P NEW LOW 30 MIN: CPT | Performed by: INTERNAL MEDICINE

## 2022-05-23 RX ORDER — LEVOTHYROXINE SODIUM 0.07 MG/1
TABLET ORAL
Qty: 90 TABLET | Refills: 2 | Status: SHIPPED | OUTPATIENT
Start: 2022-05-23 | End: 2022-09-26 | Stop reason: SDUPTHER

## 2022-05-23 NOTE — PATIENT INSTRUCTIONS
As discussed focus on taking medication as directed to avoid interfering factors. Most recent TSH is good so for now no other dose change indicated. Will see if this holds steady come next lab check and if dose change is needed again will then do trial of branded medication to determine if the source of the problem is generic medication.     Labs to be done at least 5-7 days before return appointment.  If labs are not done within 3 days of scheduled return appointment the appointment will be canceled and rescheduled to a later date with requirement for labs to be done as directed.      Bring med list to all appointments.

## 2022-05-26 ENCOUNTER — PATIENT ROUNDING (BHMG ONLY) (OUTPATIENT)
Dept: ENDOCRINOLOGY | Age: 42
End: 2022-05-26

## 2022-07-13 DIAGNOSIS — E03.9 HYPOTHYROIDISM, UNSPECIFIED TYPE: ICD-10-CM

## 2022-07-14 LAB
T4 FREE SERPL-MCNC: 1.38 NG/DL (ref 0.82–1.77)
TSH SERPL DL<=0.005 MIU/L-ACNC: 1.58 UIU/ML (ref 0.45–4.5)

## 2022-09-26 DIAGNOSIS — E03.9 HYPOTHYROIDISM, UNSPECIFIED TYPE: Chronic | ICD-10-CM

## 2022-09-26 NOTE — TELEPHONE ENCOUNTER
Caller: Brayan Martins    Relationship: Self    Best call back number: [phone]    Requested Prescriptions:   Requested Prescriptions     Pending Prescriptions Disp Refills   • levothyroxine (SYNTHROID, LEVOTHROID) 75 MCG tablet 90 tablet 2     Sig: By mouth take 1 tab daily in AM as directed on empty stomach with water only.        Pharmacy where request should be sent: [id number] [place], [place] - [address] AT [facility] OF [facility] [phone] [facility] [phone] FX     Additional details provided by patient: PATIENT TOOK LAST TABLET OF MEDICATION TODAY.    Does the patient have less than a 3 day supply:  [x] Yes  [] No    Adelita Villa Rep   09/26/22 13:31 EDT

## 2022-09-27 RX ORDER — LEVOTHYROXINE SODIUM 0.07 MG/1
TABLET ORAL
Qty: 90 TABLET | Refills: 3 | Status: SHIPPED | OUTPATIENT
Start: 2022-09-27

## 2022-10-24 ENCOUNTER — OFFICE VISIT (OUTPATIENT)
Dept: INTERNAL MEDICINE | Age: 42
End: 2022-10-24

## 2022-10-24 VITALS
HEIGHT: 69 IN | OXYGEN SATURATION: 98 % | BODY MASS INDEX: 23.25 KG/M2 | SYSTOLIC BLOOD PRESSURE: 100 MMHG | WEIGHT: 157 LBS | HEART RATE: 90 BPM | TEMPERATURE: 97.7 F | DIASTOLIC BLOOD PRESSURE: 70 MMHG

## 2022-10-24 DIAGNOSIS — E78.00 PURE HYPERCHOLESTEROLEMIA: ICD-10-CM

## 2022-10-24 DIAGNOSIS — Z00.00 ENCOUNTER FOR ANNUAL HEALTH EXAMINATION: ICD-10-CM

## 2022-10-24 DIAGNOSIS — R53.83 FATIGUE, UNSPECIFIED TYPE: ICD-10-CM

## 2022-10-24 DIAGNOSIS — Z12.5 ENCOUNTER FOR SCREENING FOR MALIGNANT NEOPLASM OF PROSTATE: ICD-10-CM

## 2022-10-24 DIAGNOSIS — E55.9 VITAMIN D DEFICIENCY: ICD-10-CM

## 2022-10-24 DIAGNOSIS — E03.9 HYPOTHYROIDISM, UNSPECIFIED TYPE: Primary | Chronic | ICD-10-CM

## 2022-10-24 PROCEDURE — 99214 OFFICE O/P EST MOD 30 MIN: CPT | Performed by: INTERNAL MEDICINE

## 2022-10-24 RX ORDER — MULTIVITAMIN WITH IRON
1 TABLET ORAL DAILY
Qty: 30 TABLET | Refills: 11 | Status: SHIPPED | OUTPATIENT
Start: 2022-10-24 | End: 2023-10-24

## 2022-10-24 RX ORDER — ACETAMINOPHEN 160 MG
2000 TABLET,DISINTEGRATING ORAL DAILY
COMMUNITY
Start: 2022-10-24

## 2022-10-24 NOTE — PROGRESS NOTES
I N T E R N A L  M E D I C I N E    J U N O H  K I M,  M D      ENCOUNTER DATE:  10/24/2022    Brayan Martins / 42 y.o. / male    CHIEF COMPLAINT / REASON FOR OFFICE VISIT     Hypothyroidism      ASSESSMENT & PLAN     Problem List Items Addressed This Visit        High    Hypothyroidism - Primary (Chronic)    Overview     - TPO antibody negative         Relevant Medications    levothyroxine (SYNTHROID, LEVOTHROID) 75 MCG tablet       Medium    Vitamin D deficiency (Chronic)    Current Assessment & Plan     Start vitamin D 2000 IU daily         Relevant Medications    Cholecalciferol (Vitamin D3) 50 MCG (2000 UT) capsule    Other Relevant Orders    Vitamin D,25-Hydroxy   Other Visit Diagnoses     Fatigue, unspecified type        Encounter for annual health examination        Relevant Orders    Comprehensive Metabolic Panel    Lipid Panel With / Chol / HDL Ratio    Hemoglobin A1c    CBC & Differential    TSH+Free T4    PSA Screen    Urinalysis With Microscopic If Indicated (No Culture) - Urine, Clean Catch    Vitamin D,25-Hydroxy    Encounter for screening for malignant neoplasm of prostate        Relevant Orders    PSA Screen        Orders Placed This Encounter   Procedures   • Comprehensive Metabolic Panel   • Lipid Panel With / Chol / HDL Ratio   • Hemoglobin A1c   • TSH+Free T4   • PSA Screen   • Urinalysis With Microscopic If Indicated (No Culture) - Urine, Clean Catch   • Vitamin D,25-Hydroxy   • CBC & Differential     New Medications Ordered This Visit   Medications   • Cholecalciferol (Vitamin D3) 50 MCG (2000 UT) capsule     Sig: Take 1 capsule by mouth Daily.   • B Complex-C (B-complex with vitamin C) tablet     Sig: Take 1 tablet by mouth Daily.     Dispense:  30 tablet     Refill:  11       SUMMARY/DISCUSSION  •       Next Appointment with me: Visit date not found    Return in about 6 months (around 4/24/2023) for ANNUAL PHYSICAL.      VITAL SIGNS     Vitals:    10/24/22 0818   BP: 100/70   Pulse: 90  "  Temp: 97.7 °F (36.5 °C)   SpO2: 98%   Weight: 71.2 kg (157 lb)   Height: 175.3 cm (69\")       BP Readings from Last 3 Encounters:   10/24/22 100/70   05/23/22 110/64   04/12/22 92/60     Wt Readings from Last 3 Encounters:   10/24/22 71.2 kg (157 lb)   05/23/22 72.1 kg (159 lb)   04/12/22 73.5 kg (162 lb)     Body mass index is 23.18 kg/m².    Blood pressure readings recorded on patient flowsheet:  No flowsheet data found.       MEDICATIONS AT THE TIME OF OFFICE VISIT     Current Outpatient Medications on File Prior to Visit   Medication Sig   • levothyroxine (SYNTHROID, LEVOTHROID) 75 MCG tablet By mouth take 1 tab daily in AM as directed on empty stomach with water only.     No current facility-administered medications on file prior to visit.          HISTORY OF PRESENT ILLNESS     Patient complains of nonspecific fatigue over the last 1 month.  Denies any change in health, medications, sleep.  His work stress may be a little bit higher.  He has history of vitamin D deficiency but has not been taking vitamin D supplements.  Most recent thyroid level in July was optimal with levothyroxine 75 mcg.      Patient Care Team:  Kyrie Ayala MD as PCP - General (Internal Medicine)  Yogesh Keen MD as Consulting Physician (Urology)    REVIEW OF SYSTEMS     Review of Systems   Fatigue x 1 month  Intended wt loss   No chest pain or shortness of breath   GI negative   Neuro negative  Psych negative prostatic COVID-19      PHYSICAL EXAMINATION     Physical Exam  General: No acute distress  Psych: Normal thought and judgment   Normal affect and mood.       REVIEWED DATA     Labs:     Lab Results   Component Value Date     04/12/2022    K 4.2 04/12/2022    CALCIUM 9.3 04/12/2022    AST 29 04/12/2022    ALT 27 04/12/2022    BUN 19 04/12/2022    CREATININE 1.03 04/12/2022    CREATININE 0.83 04/01/2021    CREATININE 1.00 07/13/2020    EGFRIFNONA 102 04/01/2021    EGFRIFAFRI 123 04/01/2021       Lab Results "   Component Value Date    HGBA1C 5.3 04/12/2022    HGBA1C 5.59 04/01/2021    HGBA1C 5.30 07/13/2020       Lab Results   Component Value Date     (H) 04/12/2022    LDL 76 04/01/2021    LDL 97 08/30/2019    HDL 42 04/12/2022    HDL 43 04/01/2021    TRIG 66 04/12/2022    TRIG 73 04/01/2021       Lab Results   Component Value Date    TSH 1.580 07/13/2022    TSH 2.310 04/12/2022    TSH 0.057 (L) 03/01/2022    FREET4 1.38 07/13/2022    FREET4 1.16 04/12/2022    FREET4 2.14 (H) 03/01/2022       Lab Results   Component Value Date    WBC 4.5 04/12/2022    HGB 13.9 04/12/2022     04/12/2022     No results found for: [id number]   Lab Results   Component Value Date    MGHR27IV 26.1 (L) 03/22/2018      No results found for: MALBCRERATIO       Imaging:           Medical Tests:           Summary of old records / correspondence / consultant report:           Request outside records:             *Examiner was wearing KN95 mask and eye protection during the entire duration of the visit. Patient was masked the entire time. Minimum social distance of 6 ft maintained entire visit except if physical contact was necessary as documented.       Template created by Milton Ayala MD

## 2022-10-24 NOTE — ASSESSMENT & PLAN NOTE
Maintain low saturated fat diet.  Will recheck labs with physical labs in 6 months.    Lab Results   Component Value Date     (H) 04/12/2022    LDL 76 04/01/2021    LDL 97 08/30/2019     Lab Results   Component Value Date    HDL 42 04/12/2022    HDL 43 04/01/2021    HDL 36 (L) 08/30/2019     Lab Results   Component Value Date    TRIG 66 04/12/2022    TRIG 73 04/01/2021    TRIG 119 08/30/2019     Lab Results   Component Value Date    CHOLHDLRATIO 3.9 04/12/2022    CHOLHDLRATIO 3.12 04/01/2021    CHOLHDLRATIO 4.36 08/30/2019

## 2022-10-24 NOTE — ASSESSMENT & PLAN NOTE
Start vitamin D 2000 IU daily    Lab Results   Component Value Date    DDOA45PY 26.1 (L) 03/22/2018

## 2022-10-24 NOTE — ASSESSMENT & PLAN NOTE
Nonspecific symptoms for 1 month. Monitor for worsening symptoms. Thyroid level is optimal. Recommended b complex with C and vitamin D 2000 IU daily.

## 2023-04-13 DIAGNOSIS — E55.9 VITAMIN D DEFICIENCY: ICD-10-CM

## 2023-04-13 DIAGNOSIS — Z12.5 ENCOUNTER FOR SCREENING FOR MALIGNANT NEOPLASM OF PROSTATE: ICD-10-CM

## 2023-04-13 DIAGNOSIS — Z00.00 ENCOUNTER FOR ANNUAL HEALTH EXAMINATION: ICD-10-CM

## 2023-04-14 LAB
25(OH)D3+25(OH)D2 SERPL-MCNC: 17.2 NG/ML (ref 30–100)
ALBUMIN SERPL-MCNC: 5 G/DL (ref 3.5–5.2)
ALBUMIN/GLOB SERPL: 2 G/DL
ALP SERPL-CCNC: 82 U/L (ref 39–117)
ALT SERPL-CCNC: 18 U/L (ref 1–41)
APPEARANCE UR: CLEAR
AST SERPL-CCNC: 17 U/L (ref 1–40)
BASOPHILS # BLD AUTO: 0.02 10*3/MM3 (ref 0–0.2)
BASOPHILS NFR BLD AUTO: 0.5 % (ref 0–1.5)
BILIRUB SERPL-MCNC: 1.4 MG/DL (ref 0–1.2)
BILIRUB UR QL STRIP: NEGATIVE
BUN SERPL-MCNC: 15 MG/DL (ref 6–20)
BUN/CREAT SERPL: 13.4 (ref 7–25)
CALCIUM SERPL-MCNC: 10.4 MG/DL (ref 8.6–10.5)
CHLORIDE SERPL-SCNC: 102 MMOL/L (ref 98–107)
CHOLEST SERPL-MCNC: 166 MG/DL (ref 0–200)
CHOLEST/HDLC SERPL: 3.69 {RATIO}
CO2 SERPL-SCNC: 27.1 MMOL/L (ref 22–29)
COLOR UR: YELLOW
CREAT SERPL-MCNC: 1.12 MG/DL (ref 0.76–1.27)
EGFRCR SERPLBLD CKD-EPI 2021: 83.1 ML/MIN/1.73
EOSINOPHIL # BLD AUTO: 0.04 10*3/MM3 (ref 0–0.4)
EOSINOPHIL NFR BLD AUTO: 1 % (ref 0.3–6.2)
ERYTHROCYTE [DISTWIDTH] IN BLOOD BY AUTOMATED COUNT: 12.3 % (ref 12.3–15.4)
GLOBULIN SER CALC-MCNC: 2.5 GM/DL
GLUCOSE SERPL-MCNC: 102 MG/DL (ref 65–99)
GLUCOSE UR QL STRIP: NEGATIVE
HBA1C MFR BLD: 5.1 % (ref 4.8–5.6)
HCT VFR BLD AUTO: 42.2 % (ref 37.5–51)
HDLC SERPL-MCNC: 45 MG/DL (ref 40–60)
HGB BLD-MCNC: 14.4 G/DL (ref 13–17.7)
HGB UR QL STRIP: NEGATIVE
IMM GRANULOCYTES # BLD AUTO: 0.01 10*3/MM3 (ref 0–0.05)
IMM GRANULOCYTES NFR BLD AUTO: 0.2 % (ref 0–0.5)
KETONES UR QL STRIP: NEGATIVE
LDLC SERPL CALC-MCNC: 103 MG/DL (ref 0–100)
LEUKOCYTE ESTERASE UR QL STRIP: NEGATIVE
LYMPHOCYTES # BLD AUTO: 0.99 10*3/MM3 (ref 0.7–3.1)
LYMPHOCYTES NFR BLD AUTO: 24.6 % (ref 19.6–45.3)
MCH RBC QN AUTO: 31 PG (ref 26.6–33)
MCHC RBC AUTO-ENTMCNC: 34.1 G/DL (ref 31.5–35.7)
MCV RBC AUTO: 90.8 FL (ref 79–97)
MONOCYTES # BLD AUTO: 0.49 10*3/MM3 (ref 0.1–0.9)
MONOCYTES NFR BLD AUTO: 12.2 % (ref 5–12)
NEUTROPHILS # BLD AUTO: 2.48 10*3/MM3 (ref 1.7–7)
NEUTROPHILS NFR BLD AUTO: 61.5 % (ref 42.7–76)
NITRITE UR QL STRIP: NEGATIVE
NRBC BLD AUTO-RTO: 0.2 /100 WBC (ref 0–0.2)
PH UR STRIP: 5.5 [PH] (ref 5–8)
PLATELET # BLD AUTO: 182 10*3/MM3 (ref 140–450)
POTASSIUM SERPL-SCNC: 4.4 MMOL/L (ref 3.5–5.2)
PROT SERPL-MCNC: 7.5 G/DL (ref 6–8.5)
PROT UR QL STRIP: ABNORMAL
PSA SERPL-MCNC: 0.41 NG/ML (ref 0–4)
RBC # BLD AUTO: 4.65 10*6/MM3 (ref 4.14–5.8)
SODIUM SERPL-SCNC: 140 MMOL/L (ref 136–145)
SP GR UR STRIP: 1.03 (ref 1–1.03)
T4 FREE SERPL-MCNC: 1.55 NG/DL (ref 0.93–1.7)
TRIGL SERPL-MCNC: 98 MG/DL (ref 0–150)
TSH SERPL DL<=0.005 MIU/L-ACNC: 4.05 UIU/ML (ref 0.27–4.2)
UROBILINOGEN UR STRIP-MCNC: ABNORMAL MG/DL
VLDLC SERPL CALC-MCNC: 18 MG/DL (ref 5–40)
WBC # BLD AUTO: 4.03 10*3/MM3 (ref 3.4–10.8)

## 2023-04-21 ENCOUNTER — OFFICE VISIT (OUTPATIENT)
Dept: INTERNAL MEDICINE | Age: 43
End: 2023-04-21
Payer: COMMERCIAL

## 2023-04-21 VITALS
HEIGHT: 69 IN | DIASTOLIC BLOOD PRESSURE: 64 MMHG | TEMPERATURE: 97.5 F | OXYGEN SATURATION: 95 % | BODY MASS INDEX: 22.81 KG/M2 | SYSTOLIC BLOOD PRESSURE: 124 MMHG | WEIGHT: 154 LBS | HEART RATE: 75 BPM

## 2023-04-21 DIAGNOSIS — E78.00 PURE HYPERCHOLESTEROLEMIA: Chronic | ICD-10-CM

## 2023-04-21 DIAGNOSIS — Z00.00 ENCOUNTER FOR ANNUAL HEALTH EXAMINATION: Primary | ICD-10-CM

## 2023-04-21 DIAGNOSIS — Z12.11 SCREENING FOR COLON CANCER: ICD-10-CM

## 2023-04-21 DIAGNOSIS — E55.9 VITAMIN D DEFICIENCY: Chronic | ICD-10-CM

## 2023-04-21 DIAGNOSIS — E03.9 HYPOTHYROIDISM, UNSPECIFIED TYPE: Chronic | ICD-10-CM

## 2023-04-21 PROBLEM — R53.83 FATIGUE: Status: RESOLVED | Noted: 2022-10-24 | Resolved: 2023-04-21

## 2023-04-21 RX ORDER — CHOLECALCIFEROL (VITAMIN D3) 1250 MCG
50000 CAPSULE ORAL
Qty: 12 CAPSULE | Refills: 1 | Status: SHIPPED | OUTPATIENT
Start: 2023-04-21 | End: 2023-08-19

## 2023-04-21 NOTE — PROGRESS NOTES
"    I N T E R N A L  M E D I C I N E    J U N O H  K I M,  M D      ENCOUNTER DATE:  04/21/2023    Brayan Eliezer / 42 y.o. / male    CHIEF COMPLAINT     Annual Exam (4/12/22)      VITALS     Vitals:    04/21/23 1054   BP: 124/64   Pulse: 75   Temp: 97.5 °F (36.4 °C)   SpO2: 95%   Weight: 69.9 kg (154 lb)   Height: 175.3 cm (69\")       BP Readings from Last 3 Encounters:   04/21/23 124/64   10/24/22 100/70   05/23/22 110/64     Wt Readings from Last 3 Encounters:   04/21/23 69.9 kg (154 lb)   10/24/22 71.2 kg (157 lb)   05/23/22 72.1 kg (159 lb)      Body mass index is 22.74 kg/m².    Blood pressure readings recorded on patient flowsheet:       View : No data to display.                 MEDICATIONS     Current Outpatient Medications on File Prior to Visit   Medication Sig Dispense Refill   • B Complex-C (B-complex with vitamin C) tablet Take 1 tablet by mouth Daily. 30 tablet 11   • levothyroxine (SYNTHROID, LEVOTHROID) 75 MCG tablet By mouth take 1 tab daily in AM as directed on empty stomach with water only. 90 tablet 3   • [DISCONTINUED] Cholecalciferol (Vitamin D3) 50 MCG (2000 UT) capsule Take 1 capsule by mouth Daily.       No current facility-administered medications on file prior to visit.         HISTORY OF PRESENT ILLNESS      Brayan presents for annual health maintenance visit.  Patient denies any significant changes or problems.  He is concerned about his strong family history of early coronary artery disease.  He has mild elevation of LDL cholesterol and low HDL.  He has no other significant risk factors for heart disease at this time except for family history.  He is on levothyroxine 75 mcg for hypothyroidism and his TSH level is notably higher but his free T4 is within normal.  Clinically he appears to be euthyroid.  He has vitamin D deficiency and vitamin D level is lower than last time while taking vitamin D 2000 international units over-the-counter.  He does report to missing doses of vitamin D at " times.    · General health: some medical problems  · Lifestyle:  · Attempting to lose weight?: Yes   · Diet: eats decently  · Exercise: exercises 2 days/week  · Tobacco: Never used   · Alcohol: does not drink  · Work: Full-time  · Reproductive health:  · Sexually active?: Yes   · Concern for STD?: No   · Sexual problems?: No problems   · Sees Urologist?: No   · Depression Screenin/21/2023    10:57 AM   PHQ-2/PHQ-9 Depression Screening   Little Interest or Pleasure in Doing Things 0-->not at all   Feeling Down, Depressed or Hopeless 0-->not at all   PHQ-9: Brief Depression Severity Measure Score 0         PHQ-2: 0 (Not depressed)     PHQ-9: 0 (Negative screening for depression)    Patient Care Team:  Kyrie Ayala MD as PCP - General (Internal Medicine)  Yogesh Keen MD as Consulting Physician (Urology)  ______________________________________________________________________    ALLERGIES  No Known Allergies     PFSH:     The following portions of the patient's history were reviewed and updated as appropriate: Allergies / Current Medications / Past Medical History / Surgical History / Social History / Family History    PROBLEM LIST   Patient Active Problem List   Diagnosis   • Hypothyroidism   • Vitamin D deficiency   • Dry skin dermatitis   • Pure hypercholesterolemia       PAST MEDICAL HISTORY  Past Medical History:   Diagnosis Date   • Hypothyroidism    • Impaired glucose metabolism    • Kidney stone     passed   • Vitamin D deficiency        SURGICAL HISTORY  Past Surgical History:   Procedure Laterality Date   • CIRCUMCISION     • CYSTOSCOPY W/ LASER LITHOTRIPSY Right 2021       SOCIAL HISTORY  Social History     Socioeconomic History   • Marital status:      Spouse name: Mikki   • Number of children: 2   Tobacco Use   • Smoking status: Never   • Smokeless tobacco: Never   Substance and Sexual Activity   • Alcohol use: Not Currently     Comment: occasional   • Drug use:  No   • Sexual activity: Yes     Partners: Female     Birth control/protection: Surgical       FAMILY HISTORY  Family History   Problem Relation Age of Onset   • No Known Problems Mother    • Hypertension Father    • Coronary artery disease Father 50        s/p cabg   • Hyperlipidemia Father    • No Known Problems Sister    • No Known Problems Paternal Grandmother          82   • Hypertension Paternal Grandfather          85   • No Known Problems Daughter    • No Known Problems Son    • Diabetes type II Maternal Uncle    • Coronary artery disease Maternal Uncle         cabg   • Coronary artery disease Paternal Aunt 68   • Coronary artery disease Paternal Uncle 68   • Diabetes Neg Hx    • Thyroid disease Neg Hx    • Cancer Neg Hx    • Colon polyps Neg Hx    • Prostate cancer Neg Hx    • Nephrolithiasis Neg Hx        IMMUNIZATION HISTORY  Immunization History   Administered Date(s) Administered   • COVID-19 (PFIZER) BIVALENT BOOSTER 12+YRS 2022   • COVID-19 (PFIZER) PURPLE CAP 2021, 2021, 2021   • Flu Vaccine Split Quad 10/07/2020   • FluLaval/Fluzone >6mos 10/07/2020   • Influenza Injectable Mdck Pf Quad 2022   • Influenza, Unspecified 2019   • MMR 2021   • Tdap 2019         REVIEW OF SYSTEMS     Review of Systems   Constitutional: Negative.    HENT: Negative.    Eyes: Negative.    Respiratory: Negative.         Snores    Cardiovascular: Negative.    Gastrointestinal: Negative.    Endocrine: Negative.    Genitourinary: Negative.    Musculoskeletal: Negative.    Skin: Negative.    Allergic/Immunologic: Negative.    Neurological: Negative.    Hematological: Negative.    Psychiatric/Behavioral: Negative.          PHYSICAL EXAMINATION     Physical Exam  Constitutional:       General: He is not in acute distress.     Appearance: Normal appearance. He is well-developed.   HENT:      Head: Normocephalic and atraumatic.      Right Ear: Tympanic membrane and external ear  normal.      Left Ear: Tympanic membrane, ear canal and external ear normal.      Ears:      Comments: Cerumen impaction right      Mouth/Throat:      Mouth: Mucous membranes are moist.      Pharynx: Oropharynx is clear.   Eyes:      General: No scleral icterus.     Conjunctiva/sclera: Conjunctivae normal.      Pupils: Pupils are equal, round, and reactive to light.   Neck:      Thyroid: No thyroid mass or thyromegaly.      Vascular: No carotid bruit.      Trachea: No tracheal deviation.   Cardiovascular:      Rate and Rhythm: Normal rate and regular rhythm.      Pulses: Normal pulses.      Heart sounds: Normal heart sounds.      Comments: No carotid bruit  Pulmonary:      Effort: Pulmonary effort is normal.      Breath sounds: Normal breath sounds.   Abdominal:      General: There is no distension.      Palpations: Abdomen is soft. There is no mass.      Tenderness: There is no abdominal tenderness.      Hernia: No hernia is present.   Musculoskeletal:         General: Normal range of motion.      Cervical back: Neck supple.      Right lower leg: No edema.      Left lower leg: No edema.   Lymphadenopathy:      Cervical: No cervical adenopathy.      Upper Body:      Right upper body: No axillary adenopathy.      Left upper body: No axillary adenopathy.   Skin:     General: Skin is warm.      Coloration: Skin is not jaundiced or pale.      Findings: No lesion (Negative for suspicious skin lesions/growths) or rash.      Comments: No jaundice  No suspicious skin lesions.    Neurological:      Mental Status: He is alert and oriented to person, place, and time.      Cranial Nerves: No cranial nerve deficit.      Motor: No abnormal muscle tone.      Deep Tendon Reflexes: Reflexes normal.   Psychiatric:         Mood and Affect: Mood normal.         Behavior: Behavior normal.         Thought Content: Thought content normal.         Judgment: Judgment normal.         REVIEWED DATA      Labs:    Lab Results   Component Value  Date     04/14/2023    K 4.4 04/14/2023    CALCIUM 10.4 04/14/2023    AST 17 04/14/2023    ALT 18 04/14/2023    BUN 15 04/14/2023    CREATININE 1.12 04/14/2023    CREATININE 1.03 04/12/2022    CREATININE 0.83 04/01/2021    EGFRIFNONA 102 04/01/2021    EGFRIFAFRI 123 04/01/2021       Lab Results   Component Value Date    GLUCOSE 102 (H) 04/14/2023    HGBA1C 5.10 04/14/2023    HGBA1C 5.3 04/12/2022    HGBA1C 5.59 04/01/2021    TSH 4.050 04/14/2023    FREET4 1.55 04/14/2023       Lab Results   Component Value Date    PSA 0.409 04/14/2023    PSA 0.5 04/12/2022    PSA 0.404 04/01/2021       No results found for: TESTOSTERONE, TESTOSTEROTT, TESTFRE    Lab Results   Component Value Date     (H) 04/14/2023    HDL 45 04/14/2023    TRIG 98 04/14/2023    CHOLHDLRATIO 3.69 04/14/2023       No components found for: BMXF288F    Lab Results   Component Value Date    WBC 4.03 04/14/2023    HGB 14.4 04/14/2023    MCV 90.8 04/14/2023     04/14/2023       Lab Results   Component Value Date    PROTEIN Trace (A) 04/14/2023    GLUCOSEU Negative 04/14/2023    BLOODU Negative 04/14/2023    NITRITEU Negative 04/14/2023    LEUKOCYTESUR Negative 04/14/2023          Lab Results   Component Value Date    HEPCVIRUSABY <0.1 04/01/2021       Imaging:           Medical Tests:           ASSESSMENT & PLAN     ANNUAL WELLNESS EXAM / PHYSICAL     Other medical problems addressed today:  Problem List Items Addressed This Visit        High    Hypothyroidism (Chronic)    Overview     - TPO antibody negative         Current Assessment & Plan     Continue levothyroxine 75 mcg for now.  Recheck thyroid function in 4 months.    Lab Results   Component Value Date    TSH 4.050 04/14/2023    TSH 1.580 07/13/2022    TSH 2.310 04/12/2022    FREET4 1.55 04/14/2023    FREET4 1.38 07/13/2022    FREET4 1.16 04/12/2022             Relevant Medications    levothyroxine (SYNTHROID, LEVOTHROID) 75 MCG tablet    Other Relevant Orders    TSH+Free T4        Medium    Vitamin D deficiency (Chronic)    Current Assessment & Plan     Lab Results   Component Value Date    NQUK80UK 17.2 (L) 04/14/2023    PPZI70AC 26.1 (L) 03/22/2018      Start vitamin D 50,000 IUs weekly by prescription for 4 months.  Schedule recheck of vitamin D at that time.         Relevant Medications    Cholecalciferol (Vitamin D3) 1.25 MG (27462 UT) capsule    Other Relevant Orders    Vitamin D,25-Hydroxy    Pure hypercholesterolemia (Chronic)    Overview     Maintain low saturated fat/cholesterol diet.           Current Assessment & Plan     Maintain low saturated fat/cholesterol diet.  Suggested CT cardiac calcium score test in light of his strong family history of early heart disease.        Other Visit Diagnoses     Encounter for annual health examination    -  Primary    Screening for colon cancer        Relevant Orders    Ambulatory Referral For Screening Colonoscopy          Summary/Discussion:     · Consider sleep study  · Cardiac CT calcium scoring test recommended.      Next Appointment with me: Visit date not found    Return in about 6 months (around 10/21/2023) for Reassess chronic medical problems.      HEALTHCARE MAINTENANCE ISSUES       Cancer Screening:  · Colon: Initial/Next screening at age: 43 and ORDERED COLONOSCOPY  · Repeat colon cancer screening: N/A at this time  · Prostate: Performed today and recommended annual screening  · Testicular: Recommended monthly self exam  · Skin: Monthly self skin examination, annual exam by health professional  · Lung: Does not meet criteria for lung cancer screening.   · Other:    Screening Labs & Tests:  · Lab results reviewed & discussed with with patient or orders placed today.  · EKG:  · CV Screening: Lipid panel and Ischemic heart disease (Cardiac CT calcium score) recommended/ordered  · DEXA (75+ or risk factors):   · HEP C (If born 5120-5[birth year] or risk factors): Previously had negative screen  · Other:     Immunization/Vaccinations (to be  given today unless deferred by patient)  · Influenza: Recommended annual influenza vaccine  · Hepatitis A: Recommended here or at pharmacy  · Hepatitis B: Not needed at this time  · Tetanus/Pertussis: Up to date  · Pneumococcal: Not needed at this time  · Shingles: Not needed at this time  · COVID: Had the bivalent vaccine  Lifestyle Counseling:  · Lifestyle Modifications: Attempt to lose weight, Maintain a low sugar/carbohydrate diet, Follow a low fat, low cholesterol diet, Reduce exposure to stress if possible and Discussed better management of stress/anxiety  · Safety Issues: Always wear seatbelt, Avoid texting while driving   · Use sunscreen, regular skin examination  · Recommended annual dental/vision examination.  · Emotional/Stress/Sleep: Reviewed and  given when appropriate      Health Maintenance   Topic Date Due   • INFLUENZA VACCINE  08/01/2023   • PROSTATE CANCER SCREENING  04/14/2024   • LIPID PANEL  04/14/2024   • ANNUAL PHYSICAL  04/21/2024   • TDAP/TD VACCINES (2 - Td or Tdap) 09/11/2029   • HEPATITIS C SCREENING  Completed   • COVID-19 Vaccine  Completed   • Pneumococcal Vaccine 0-64  Aged Out           *Examiner was wearing KN95 mask during the entire duration of the visit. Patient was masked the entire time. Minimum social distance of 6 ft maintained entire visit except if physical contact was necessary as documented.       Template created by Milton Ayala MD

## 2023-04-21 NOTE — ASSESSMENT & PLAN NOTE
Lab Results   Component Value Date    OBQO98NI 17.2 (L) 04/14/2023    [id number] 26.1 (L) 03/22/2018      Start vitamin D 50,000 IUs weekly by prescription for 4 months.  Schedule recheck of vitamin D at that time.

## 2023-04-21 NOTE — ASSESSMENT & PLAN NOTE
Maintain low saturated fat/cholesterol diet.  Suggested CT cardiac calcium score test in light of his strong family history of early heart disease.

## 2023-04-21 NOTE — PATIENT INSTRUCTIONS
** IMPORTANT MESSAGE FROM DR. MARTINEZ **    In our office, your satisfaction is VERY important to us.     You may receive a survey from [facility] [place] by mail or E-mail for you to provide feedback about your visit. This information is invaluable for me to know what we can do to improve our services.     I ask that you please take a few minutes to complete the survey and let us know how we are doing in serving your needs. (You may receive the survey more than once for multiple visits)    Thank You !    Dr. Martinez    _________________________________________________________________________________________________________________________      ** ADDITIONAL INSTRUCTION / REMINDERS FROM DR. MARTINEZ **

## 2023-04-21 NOTE — ASSESSMENT & PLAN NOTE
Continue levothyroxine 75 mcg for now.  Recheck thyroid function in 4 months.    Lab Results   Component Value Date    TSH 4.050 04/14/2023    TSH 1.580 07/13/2022    TSH 2.310 04/12/2022    FREET4 1.55 04/14/2023    FREET4 1.38 07/13/2022    FREET4 1.16 04/12/2022

## 2023-08-21 ENCOUNTER — TELEPHONE (OUTPATIENT)
Dept: INTERNAL MEDICINE | Age: 43
End: 2023-08-21
Payer: COMMERCIAL

## 2023-08-21 NOTE — TELEPHONE ENCOUNTER
"Message left in [facility] on 8/19/23 at 0947:    \"I have a lab appointment on Monday. Need to check if I can take the thyroid medicine before the lab.\"  "

## 2023-10-27 ENCOUNTER — OFFICE VISIT (OUTPATIENT)
Dept: INTERNAL MEDICINE | Age: 43
End: 2023-10-27
Payer: COMMERCIAL

## 2023-10-27 VITALS
DIASTOLIC BLOOD PRESSURE: 80 MMHG | SYSTOLIC BLOOD PRESSURE: 110 MMHG | TEMPERATURE: 97.1 F | HEART RATE: 57 BPM | WEIGHT: 152 LBS | HEIGHT: 69 IN | BODY MASS INDEX: 22.51 KG/M2 | OXYGEN SATURATION: 99 %

## 2023-10-27 DIAGNOSIS — E03.9 HYPOTHYROIDISM, UNSPECIFIED TYPE: Chronic | ICD-10-CM

## 2023-10-27 DIAGNOSIS — E78.00 PURE HYPERCHOLESTEROLEMIA: Chronic | ICD-10-CM

## 2023-10-27 DIAGNOSIS — Z00.00 ENCOUNTER FOR ANNUAL HEALTH EXAMINATION: Primary | ICD-10-CM

## 2023-10-27 DIAGNOSIS — Z12.5 ENCOUNTER FOR SCREENING FOR MALIGNANT NEOPLASM OF PROSTATE: ICD-10-CM

## 2023-10-27 NOTE — ASSESSMENT & PLAN NOTE
Lab Results   Component Value Date     (H) 04/14/2023     (H) 04/12/2022    LDL 76 04/01/2021    TRIG 98 04/14/2023    CHOLHDLRATIO 3.69 04/14/2023      LDL cholesterol was previously

## 2023-10-27 NOTE — PROGRESS NOTES
I N T E R N A L  M E D I C I N E    J U N O H  K I M,  M D      ENCOUNTER DATE:  10/27/2023    Brayan Martins / 43 y.o. / male    CHIEF COMPLAINT / REASON FOR OFFICE VISIT     Hypothyroidism and Hyperlipidemia      ASSESSMENT & PLAN     Problem List Items Addressed This Visit          High    Hypothyroidism (Chronic)    Overview     - TPO antibody negative    Continue levothyroxine 75 mcg qam.          Relevant Medications    levothyroxine (SYNTHROID, LEVOTHROID) 75 MCG tablet    Other Relevant Orders    TSH+Free T4       Medium    Pure hypercholesterolemia (Chronic)    Overview     Maintain low saturated fat/cholesterol diet.           Current Assessment & Plan     Lab Results   Component Value Date     (H) 04/14/2023     (H) 04/12/2022    LDL 76 04/01/2021    TRIG 98 04/14/2023    CHOLHDLRATIO 3.69 04/14/2023      LDL cholesterol was previously          Relevant Orders    Lipid Panel With / Chol / HDL Ratio     Other Visit Diagnoses       Encounter for annual health examination    -  Primary    Relevant Orders    Comprehensive Metabolic Panel    Lipid Panel With / Chol / HDL Ratio    CBC & Differential    Hemoglobin A1c    TSH+Free T4    Urinalysis With Microscopic If Indicated (No Culture) - Urine, Clean Catch    PSA Screen    Encounter for screening for malignant neoplasm of prostate        Relevant Orders    PSA Screen          Orders Placed This Encounter   Procedures    Comprehensive Metabolic Panel    Lipid Panel With / Chol / HDL Ratio    Hemoglobin A1c    TSH+Free T4    Urinalysis With Microscopic If Indicated (No Culture) - Urine, Clean Catch    PSA Screen    CBC & Differential     No orders of the defined types were placed in this encounter.      SUMMARY/DISCUSSION        Next Appointment with me: Visit date not found    Return in about 6 months (around 4/27/2024) for SCHEDULE COMBINED ANNUAL PHYSICAL & MEDICAL FU.      VITAL SIGNS     Vitals:    10/27/23 1130   BP: 110/80   Pulse: 57  "  Temp: 97.1 °F (36.2 °C)   SpO2: 99%   Weight: 68.9 kg (152 lb)   Height: 175.3 cm (69\")       BP Readings from Last 3 Encounters:   10/27/23 110/80   04/21/23 124/64   10/24/22 100/70     Wt Readings from Last 3 Encounters:   10/27/23 68.9 kg (152 lb)   04/21/23 69.9 kg (154 lb)   10/24/22 71.2 kg (157 lb)     Body mass index is 22.45 kg/m².    Blood pressure readings recorded on patient flowsheet:       No data to display                  MEDICATIONS AT THE TIME OF OFFICE VISIT     Current Outpatient Medications on File Prior to Visit   Medication Sig    levothyroxine (SYNTHROID, LEVOTHROID) 75 MCG tablet By mouth take 1 tab daily in AM as directed on empty stomach with water only.    Cholecalciferol (Vitamin D3) 1.25 MG (09262 UT) capsule Take 1 capsule by mouth Every 7 (Seven) Days for 120 days. After 4 months, start taking Vitamin D3 2000 IU daily OTC.     No current facility-administered medications on file prior to visit.          HISTORY OF PRESENT ILLNESS     Patient has lost 5 pounds since last year. His energy level is good, and he eats a high protein diet. He reports increased hunger post lunch time. He reports he has been running with his son for the last 2 years but recently feels more tired after short distances. He denies chest pain, dyspnea, or weakness. Walking is not an issue. Blood pressure remains stable. In 04/2023 his LDL was 103 mg/dL. He notes that he recently had biometric labs completed through his employer with elevated cholesterol levels.     He has increased stress at work but is able to manage the situations. He works from home 3 days a week and in the office 2 days a week.    Hypothyroidism remains stable on levothyroxine 75 mcg. His thyroid panel from 08/21/2023 was within normal limits.    He continues vitamin D for vitamin D deficiency.       Patient Care Team:  Kyrie Ayala MD as PCP - General (Internal Medicine)  Yogesh Keen MD as Consulting Physician " "(Urology)    REVIEW OF SYSTEMS     Review of Systems       PHYSICAL EXAMINATION     Physical Exam  General: No acute distress  Psych: Normal thought and judgment   Cardiovascular Rate: normal. Rhythm: regular. Heart sounds: normal   Pulm/Chest: Effort normal, breath sounds normal.       REVIEWED DATA     Labs:     Lab Results   Component Value Date     04/14/2023    K 4.4 04/14/2023    CALCIUM 10.4 04/14/2023    AST 17 04/14/2023    ALT 18 04/14/2023    BUN 15 04/14/2023    CREATININE 1.12 04/14/2023    CREATININE 1.03 04/12/2022    CREATININE 0.83 04/01/2021    EGFRIFNONA 102 04/01/2021    EGFRIFAFRI 123 04/01/2021       Lab Results   Component Value Date    HGBA1C 5.10 04/14/2023    HGBA1C 5.3 04/12/2022    HGBA1C 5.59 04/01/2021       Lab Results   Component Value Date     (H) 04/14/2023     (H) 04/12/2022    LDL 76 04/01/2021    HDL 45 04/14/2023    HDL 42 04/12/2022    TRIG 98 04/14/2023    TRIG 66 04/12/2022       Lab Results   Component Value Date    TSH 1.980 08/21/2023    TSH 4.050 04/14/2023    TSH 1.580 07/13/2022    FREET4 1.29 08/21/2023    FREET4 1.55 04/14/2023    FREET4 1.38 07/13/2022       Lab Results   Component Value Date    WBC 4.03 04/14/2023    HGB 14.4 04/14/2023     04/14/2023       No results found for: \"MALBCRERATIO\"         Imaging:           Medical Tests:           Summary of old records / correspondence / consultant report:           Request outside records:       Transcribed from ambient dictation for Kyrie Ayala MD by Cherri Núñez.  10/27/23   13:23 EDT    Patient or patient representative verbalized consent to the visit recording.  I have personally performed the services described in this document as transcribed by the above individual, and it is both accurate and complete.  Kyrie Ayala MD  10/27/2023  16:35 EDT   "

## 2023-11-25 DIAGNOSIS — E03.9 HYPOTHYROIDISM, UNSPECIFIED TYPE: Chronic | ICD-10-CM

## 2023-11-27 RX ORDER — LEVOTHYROXINE SODIUM 0.07 MG/1
TABLET ORAL
Qty: 90 TABLET | Refills: 1 | Status: SHIPPED | OUTPATIENT
Start: 2023-11-27

## 2024-01-01 NOTE — ASSESSMENT & PLAN NOTE
Check US thyroid  
Check vitamin D level and replace as indicated.  
Long-standing history of hypothyroidism but he stopped taking medication about 8 months ago.  Recheck labs including thyroid peroxidase antibody, ultrasound of the thyroid.  Will need to resume medication pending lab results.  
Nonspecific lower abdominal discomfort.  Check CBC and urinalysis.  If symptoms continue consider CT of the abdomen and pelvis.  Patient expressed understanding of above plans.  
Recommended against keto diet at this time.  Recommended low carbohydrate high-protein diet.  Increase exercise and physical activity.  Will restart thyroid medication which should help with his weight and metabolism.  
1

## 2024-02-28 ENCOUNTER — OFFICE VISIT (OUTPATIENT)
Dept: INTERNAL MEDICINE | Age: 44
End: 2024-02-28
Payer: COMMERCIAL

## 2024-02-28 VITALS
DIASTOLIC BLOOD PRESSURE: 96 MMHG | SYSTOLIC BLOOD PRESSURE: 122 MMHG | WEIGHT: 155 LBS | BODY MASS INDEX: 22.96 KG/M2 | HEIGHT: 69 IN | HEART RATE: 72 BPM | OXYGEN SATURATION: 98 % | TEMPERATURE: 97.8 F

## 2024-02-28 DIAGNOSIS — G44.209 TENSION HEADACHE: Primary | ICD-10-CM

## 2024-02-28 DIAGNOSIS — E03.9 HYPOTHYROIDISM, UNSPECIFIED TYPE: Chronic | ICD-10-CM

## 2024-02-28 DIAGNOSIS — B35.3 TINEA PEDIS OF LEFT FOOT: ICD-10-CM

## 2024-02-28 DIAGNOSIS — B35.1 ONYCHOMYCOSIS OF LEFT GREAT TOE: ICD-10-CM

## 2024-02-28 PROCEDURE — 99214 OFFICE O/P EST MOD 30 MIN: CPT | Performed by: INTERNAL MEDICINE

## 2024-02-28 RX ORDER — PRENATAL VIT 91/IRON/FOLIC/DHA 28-975-200
1 COMBINATION PACKAGE (EA) ORAL 2 TIMES DAILY
Qty: 42 G | Refills: 1 | Status: SHIPPED | OUTPATIENT
Start: 2024-02-28

## 2024-02-28 RX ORDER — TERBINAFINE HYDROCHLORIDE 250 MG/1
250 TABLET ORAL DAILY
Qty: 30 TABLET | Refills: 2 | Status: SHIPPED | OUTPATIENT
Start: 2024-02-28

## 2024-02-28 RX ORDER — MULTIVIT-MIN/IRON/FOLIC ACID/K 18-600-40
CAPSULE ORAL
COMMUNITY

## 2024-02-28 NOTE — PROGRESS NOTES
I N T E R N A L  M E D I C I N E    J U N O H  K I M,  M D      ENCOUNTER DATE:  02/28/2024    Brayan Martins / 43 y.o. / male    CHIEF COMPLAINT / REASON FOR OFFICE VISIT     Headache (FOREHEAD x 3 weeks. /) and DRY SKIN ON LEFT FOOT (MONTHS)      ASSESSMENT & PLAN     Problem List Items Addressed This Visit          High    Hypothyroidism (Chronic)    Overview     - TPO antibody negative    Continue levothyroxine 75 mcg qam.          Current Assessment & Plan     Complains of increased fatigue, headache and weight gain.   Check TSH and Free T4.          Relevant Medications    levothyroxine (SYNTHROID, LEVOTHROID) 75 MCG tablet    Other Relevant Orders    TSH+Free T4    Tension headache - Primary    Current Assessment & Plan     This is a new problem to this examiner.   Stress mgmt discussed. Symptom mgmt discussed. Information provided on AVS.  If headaches continue beyond two weeks regularly, we can consider MRI.             Low    Tinea pedis of left foot    Current Assessment & Plan     Terbinafine for onychomycosis.   Terbinafine 1% cream BID.          Relevant Medications    terbinafine (lamiSIL) 250 MG tablet    terbinafine (lamISIL) 1 % cream    Onychomycosis of left great toe    Current Assessment & Plan     Terbinafine 250 mg QD for 12 weeks.          Relevant Medications    terbinafine (lamiSIL) 250 MG tablet    terbinafine (lamISIL) 1 % cream     Orders Placed This Encounter   Procedures    TSH+Free T4     New Medications Ordered This Visit   Medications    terbinafine (lamiSIL) 250 MG tablet     Sig: Take 1 tablet by mouth Daily.     Dispense:  30 tablet     Refill:  2    terbinafine (lamISIL) 1 % cream     Sig: Apply 1 Application topically to the appropriate area as directed 2 (Two) Times a Day.     Dispense:  42 g     Refill:  1       SUMMARY/DISCUSSION        Next Appointment with me: 5/1/2024    Return for Next scheduled follow up.      VITAL SIGNS     Vitals:    02/28/24 1044   BP: 122/96  "  Pulse: 72   Temp: 97.8 °F (36.6 °C)   SpO2: 98%   Weight: 70.3 kg (155 lb)   Height: 175.3 cm (69\")       BP Readings from Last 3 Encounters:   02/28/24 122/96   10/27/23 110/80   04/21/23 124/64     Wt Readings from Last 3 Encounters:   02/28/24 70.3 kg (155 lb)   10/27/23 68.9 kg (152 lb)   04/21/23 69.9 kg (154 lb)     Body mass index is 22.89 kg/m².    Blood pressure readings recorded on patient flowsheet:       No data to display                  MEDICATIONS AT THE TIME OF OFFICE VISIT     Current Outpatient Medications on File Prior to Visit   Medication Sig    Cholecalciferol (Vitamin D) 50 MCG (2000 UT) capsule Take  by mouth. OTC    levothyroxine (SYNTHROID, LEVOTHROID) 75 MCG tablet TAKE 1 TABLET BY MOUTH DAILY IN THE MORNING ON AN EMPTY STOMACH AND WITH WATER AS DIRECTED    [DISCONTINUED] Cholecalciferol (Vitamin D3) 1.25 MG (00461 UT) capsule Take 1 capsule by mouth Every 7 (Seven) Days for 120 days. After 4 months, start taking Vitamin D3 2000 IU daily OTC.     No current facility-administered medications on file prior to visit.          HISTORY OF PRESENT ILLNESS     The patient presents for evaluation of headaches.    He has been having frequent headaches for 3 weeks. He had an eye examination to rule out visual concerns as a cause and his eyes were within normal limits. He denies any history of chronic headaches. The headache is located across the forehead and on the bilateral sides. He denies associated photosensitivity, phonosensitivity, or  nausea. The timing of his headaches varies, with occasional headaches in the morning, during the day, or at night. He characterizes it as a \"niggling\" pain. He occasionally experiences associated difficulty focusing and the need to rest. His headaches are exacerbated by stress, and he is experiencing work related stress. He denies rhinorrhea, congestion, pressure, facial pain, or ear pressure. He sleeps for approximately 7 hours. He does not drink caffeine. " "He takes ibuprofen for severe pain. He has applied a balm to his head for pain relief as well.    He has intermittent fatigue during the day. He tries to maintain a healthy diet. He has gained 3 pounds since his most recent visit. He tries to go for a walk or jog at least 2 to 3 times weekly.  He is taking levothyroxine 75 mcg.    He has an area of dry skin on his left foot. He has been applying Aquaphor for some time without improvement. He has discoloration on his toenails.      Patient Care Team:  Kyrie Ayala MD as PCP - General (Internal Medicine)  Yogesh Keen MD as Consulting Physician (Urology)    REVIEW OF SYSTEMS     Review of Systems       PHYSICAL EXAMINATION     Physical Exam  Alert with normal thought and judgment.   Normal affect and mood.   Onychomycosis of left great toe  [place] pattern tinea pedis left foot       REVIEWED DATA     Labs:     Lab Results   Component Value Date     04/14/2023    K 4.4 04/14/2023    CALCIUM 10.4 04/14/2023    AST 17 04/14/2023    ALT 18 04/14/2023    BUN 15 04/14/2023    CREATININE 1.12 04/14/2023    CREATININE 1.03 04/12/2022    CREATININE 0.83 04/01/2021    EGFRIFNONA 102 04/01/2021    EGFRIFAFRI 123 04/01/2021       Lab Results   Component Value Date    HGBA1C 5.10 04/14/2023    HGBA1C 5.3 04/12/2022    HGBA1C 5.59 04/01/2021       Lab Results   Component Value Date     (H) 04/14/2023     (H) 04/12/2022    LDL 76 04/01/2021    HDL 45 04/14/2023    HDL 42 04/12/2022    TRIG 98 04/14/2023    TRIG 66 04/12/2022       Lab Results   Component Value Date    TSH 1.980 08/21/2023    TSH 4.050 04/14/2023    TSH 1.580 07/13/2022    FREET4 1.29 08/21/2023    FREET4 1.55 04/14/2023    FREET4 1.38 07/13/2022       Lab Results   Component Value Date    WBC 4.03 04/14/2023    HGB 14.4 04/14/2023     04/14/2023       No results found for: \"MALBCRERATIO\"         Imaging:           Medical Tests:           Summary of old records / correspondence / " consultant report:           Request outside records:       Transcribed from ambient dictation for Kyrie Ayala MD by Velasquez Jerome.  02/28/24   13:23 EST    Patient or patient representative verbalized consent to the visit recording.  I have personally performed the services described in this document as transcribed by the above individual, and it is both accurate and complete.  Kyrie Ayala MD  2/28/2024  17:32 EST

## 2024-02-28 NOTE — PATIENT INSTRUCTIONS
** IMPORTANT MESSAGE FROM DR. MARTINEZ **    In our office, your satisfaction is VERY important to us.     You may receive a survey from [facility] by mail or E-mail for you to provide feedback about your visit. This information is invaluable for me to know what we can do to improve our services.     I ask that you please take a few minutes to complete the survey and let us know how we are doing in serving your needs. (You may receive the survey more than once for multiple visits)    Thank You !    Dr. Martinez    _________________________________________________________________________________________________________________________      ** ADDITIONAL INSTRUCTION / REMINDERS FROM DR. MARTINEZ **

## 2024-02-28 NOTE — ASSESSMENT & PLAN NOTE
This is a new problem to this examiner.   Stress mgmt discussed. Symptom mgmt discussed. Information provided on AVS.  If headaches continue beyond two weeks regularly, we can consider MRI.

## 2024-04-22 LAB
T4 FREE SERPL-MCNC: 1.22 NG/DL (ref 0.93–1.7)
TSH SERPL DL<=0.005 MIU/L-ACNC: 3.5 UIU/ML (ref 0.27–4.2)

## 2024-04-29 ENCOUNTER — TELEPHONE (OUTPATIENT)
Dept: INTERNAL MEDICINE | Age: 44
End: 2024-04-29

## 2024-04-29 NOTE — TELEPHONE ENCOUNTER
Caller: Brayan Martins    Relationship: Self    Best call back number: [phone]    What is the best time to reach you: ANYTIME     Who are you requesting to speak with (clinical staff, provider,  specific staff member): DR. MARTINEZ'S ASSISTANT     What was the call regarding: PATIENT STATES HE WOULD LIKE TO SPEAK WITH DR. MARTINEZ'S ASSISTANT IN REGARDS TO HIS LABS.    PATIENT IS REQUESTING A CALL BACK.

## 2024-04-30 NOTE — TELEPHONE ENCOUNTER
Spoke with pt and informed why they draw only thyroid labs     Referring to different encounter . Dr mauricio hernandez.     He is coming fasting tomorrow.

## 2024-05-01 ENCOUNTER — OFFICE VISIT (OUTPATIENT)
Dept: INTERNAL MEDICINE | Age: 44
End: 2024-05-01
Payer: COMMERCIAL

## 2024-05-01 VITALS
DIASTOLIC BLOOD PRESSURE: 80 MMHG | OXYGEN SATURATION: 99 % | WEIGHT: 152 LBS | HEIGHT: 69 IN | HEART RATE: 63 BPM | SYSTOLIC BLOOD PRESSURE: 100 MMHG | TEMPERATURE: 97.5 F | BODY MASS INDEX: 22.51 KG/M2

## 2024-05-01 DIAGNOSIS — B35.1 ONYCHOMYCOSIS OF LEFT GREAT TOE: ICD-10-CM

## 2024-05-01 DIAGNOSIS — E03.9 HYPOTHYROIDISM, UNSPECIFIED TYPE: Chronic | ICD-10-CM

## 2024-05-01 DIAGNOSIS — Z00.00 ENCOUNTER FOR ANNUAL HEALTH EXAMINATION: Primary | ICD-10-CM

## 2024-05-01 DIAGNOSIS — Z12.11 SCREENING FOR COLON CANCER: ICD-10-CM

## 2024-05-01 DIAGNOSIS — B35.3 TINEA PEDIS OF LEFT FOOT: ICD-10-CM

## 2024-05-01 PROBLEM — R55 VASOVAGAL SYNDROME: Status: ACTIVE | Noted: 2024-05-01

## 2024-05-01 PROCEDURE — 99213 OFFICE O/P EST LOW 20 MIN: CPT | Performed by: INTERNAL MEDICINE

## 2024-05-01 PROCEDURE — 99396 PREV VISIT EST AGE 40-64: CPT | Performed by: INTERNAL MEDICINE

## 2024-05-01 RX ORDER — PRENATAL VIT 91/IRON/FOLIC/DHA 28-975-200
1 COMBINATION PACKAGE (EA) ORAL 2 TIMES DAILY
Qty: 42 G | Refills: 1 | Status: SHIPPED | OUTPATIENT
Start: 2024-05-01

## 2024-05-01 RX ORDER — TERBINAFINE HYDROCHLORIDE 250 MG/1
250 TABLET ORAL DAILY
Qty: 30 TABLET | Refills: 2 | Status: SHIPPED | OUTPATIENT
Start: 2024-05-01

## 2024-05-01 NOTE — PROGRESS NOTES
"    I N T E R N A L  M E D I C I N E    J U N O H  K I M,  M D      ENCOUNTER DATE:  05/01/2024    Brayan Martins / 44 y.o. / male    CHIEF COMPLAINT     Annual Exam (4/21/23) and chronic medical problems      VITALS     Vitals:    05/01/24 1134   BP: 100/80   Pulse: 63   Temp: 97.5 °F (36.4 °C)   SpO2: 99%   Weight: 68.9 kg (152 lb)   Height: 175.3 cm (69\")       BP Readings from Last 3 Encounters:   05/01/24 100/80   02/28/24 122/96   10/27/23 110/80     Wt Readings from Last 3 Encounters:   05/01/24 68.9 kg (152 lb)   02/28/24 70.3 kg (155 lb)   10/27/23 68.9 kg (152 lb)      Body mass index is 22.45 kg/m².    Blood pressure readings recorded on patient flowsheet:       No data to display                MEDICATIONS     Current Outpatient Medications on File Prior to Visit   Medication Sig Dispense Refill    Cholecalciferol (Vitamin D) 50 MCG (2000 UT) capsule Take  by mouth. OTC      levothyroxine (SYNTHROID, LEVOTHROID) 75 MCG tablet TAKE 1 TABLET BY MOUTH DAILY IN THE MORNING ON AN EMPTY STOMACH AND WITH WATER AS DIRECTED 90 tablet 1    [DISCONTINUED] terbinafine (lamISIL) 1 % cream Apply 1 Application topically to the appropriate area as directed 2 (Two) Times a Day. 42 g 1    [DISCONTINUED] terbinafine (lamiSIL) 250 MG tablet Take 1 tablet by mouth Daily. 30 tablet 2     No current facility-administered medications on file prior to visit.         HISTORY OF PRESENT ILLNESS      Brayan presents for annual health maintenance visit.    Was not able to get the terbinafine from the pharmacy. Using Lamisil 1% cream which seems to help with tinea pedis. Reports several episodes of near syncope which sounds like classic VV syncope. On levothyroxine 75 mcg for hypothyroidism.     Patient Care Team:  Kyrie Ayala MD as PCP - General (Internal Medicine)  Yogesh Keen MD as Consulting Physician (Urology)  eye dr (Optometry)    General health: some medical problems  Lifestyle:  Attempting to lose weight?: Yes   Diet: " eats moderately healthy  Exercise: exercises 3-5 days weekly  Tobacco: Never used   Alcohol: does not drink  Work: Full-time  Reproductive health:  Sexually active?: Yes   Concern for STD?: No   Sexual problems?: No problems   Sees Urologist?: No   Depression Screenin/1/2024    11:37 AM   PHQ-2/PHQ-9 Depression Screening   Little Interest or Pleasure in Doing Things 0-->not at all   Feeling Down, Depressed or Hopeless 0-->not at all   PHQ-9: Brief Depression Severity Measure Score 0         PHQ-2: 0 (Not depressed)     PHQ-9: 0 (Negative screening for depression)    ______________________________________________________________________    ALLERGIES  No Known Allergies     PFSH:     The following portions of the patient's history were reviewed and updated as appropriate: Allergies / Current Medications / Past Medical History / Surgical History / Social History / Family History    PROBLEM LIST   Patient Active Problem List   Diagnosis    Hypothyroidism    Vitamin D deficiency    Dry skin dermatitis    Pure hypercholesterolemia    Tinea pedis of left foot    Onychomycosis of left great toe    Tension headache    Vasovagal syndrome       PAST MEDICAL HISTORY  Past Medical History:   Diagnosis Date    Hypothyroidism 2011    Impaired glucose metabolism     Kidney stone     passed    Vitamin D deficiency        SURGICAL HISTORY  Past Surgical History:   Procedure Laterality Date    CIRCUMCISION  2009    CYSTOSCOPY W/ LASER LITHOTRIPSY Right 2021       SOCIAL HISTORY  Social History     Socioeconomic History    Marital status:      Spouse name: Mikki    Number of children: 2   Tobacco Use    Smoking status: Never    Smokeless tobacco: Never   Vaping Use    Vaping status: Never Used   Substance and Sexual Activity    Alcohol use: Not Currently     Comment: occasional    Drug use: No    Sexual activity: Yes     Partners: Female     Birth control/protection: Tubal ligation       FAMILY  HISTORY  Family History   Problem Relation Age of Onset    No Known Problems Mother     Hypertension Father     Coronary artery disease Father 50        s/p cabg    Hyperlipidemia Father     No Known Problems Sister     No Known Problems Paternal Grandmother          82    Hypertension Paternal Grandfather          85    No Known Problems Daughter     No Known Problems Son     Diabetes type II Maternal Uncle     Coronary artery disease Maternal Uncle         cabg    Coronary artery disease Paternal Aunt 68    Coronary artery disease Paternal Uncle 68    Diabetes Neg Hx     Thyroid disease Neg Hx     Cancer Neg Hx     Colon polyps Neg Hx     Prostate cancer Neg Hx     Nephrolithiasis Neg Hx        IMMUNIZATION HISTORY  Immunization History   Administered Date(s) Administered    COVID-19 (PFIZER) BIVALENT 12+YRS 2022    COVID-19 (PFIZER) Purple Cap Monovalent 2021, 2021, 2021    COVID-19 F23 (PFIZER) 12YRS+ (COMIRNATY) 2023    Flu Vaccine Split Quad 10/07/2020    Fluzone (or Fluarix & Flulaval for VFC) >6mos 10/07/2020    Influenza Injectable Mdck Pf Quad 2022    Influenza, Unspecified 2019    MMR 2021    Tdap 2019         REVIEW OF SYSTEMS     Review of Systems   Constitutional: Negative.    HENT: Negative.     Eyes: Negative.    Respiratory: Negative.     Cardiovascular: Negative.    Gastrointestinal: Negative.    Endocrine: Negative.    Genitourinary: Negative.    Musculoskeletal: Negative.    Skin: Negative.    Allergic/Immunologic: Negative.    Neurological: Negative.    Hematological: Negative.    Psychiatric/Behavioral: Negative.           PHYSICAL EXAMINATION     Physical Exam  Constitutional:       General: He is not in acute distress.     Appearance: Normal appearance. He is well-developed.   HENT:      Head: Normocephalic and atraumatic.      Right Ear: Tympanic membrane, ear canal and external ear normal.      Left Ear: Tympanic membrane, ear  canal and external ear normal.      Mouth/Throat:      Mouth: Mucous membranes are moist.      Pharynx: Oropharynx is clear.   Eyes:      General: No scleral icterus.     Conjunctiva/sclera: Conjunctivae normal.      Pupils: Pupils are equal, round, and reactive to light.   Neck:      Thyroid: No thyroid mass or thyromegaly.      Vascular: No carotid bruit.      Trachea: No tracheal deviation.   Cardiovascular:      Rate and Rhythm: Normal rate and regular rhythm.      Pulses: Normal pulses.      Heart sounds: Normal heart sounds.      Comments: No carotid bruit  Pulmonary:      Effort: Pulmonary effort is normal.      Breath sounds: Normal breath sounds.   Abdominal:      General: There is no distension.      Palpations: Abdomen is soft. There is no mass.      Tenderness: There is no abdominal tenderness.      Hernia: No hernia is present.   Musculoskeletal:         General: Normal range of motion.      Cervical back: Neck supple.      Right lower leg: No edema.      Left lower leg: No edema.   Lymphadenopathy:      Cervical: No cervical adenopathy.      Upper Body:      Right upper body: No axillary adenopathy.      Left upper body: No axillary adenopathy.   Skin:     General: Skin is warm.      Coloration: Skin is not jaundiced or pale.      Findings: No lesion (Negative for suspicious skin lesions/growths) or rash.      Comments: No jaundice  No suspicious skin lesions.    Neurological:      Mental Status: He is alert and oriented to person, place, and time.      Cranial Nerves: No cranial nerve deficit.      Motor: No abnormal muscle tone.      Deep Tendon Reflexes: Reflexes normal.   Psychiatric:         Mood and Affect: Mood normal.         Behavior: Behavior normal.         Thought Content: Thought content normal.         Judgment: Judgment normal.         REVIEWED DATA      Labs:    Lab Results   Component Value Date     04/14/2023    K 4.4 04/14/2023    CALCIUM 10.4 04/14/2023    AST 17 04/14/2023     "ALT 18 04/14/2023    BUN 15 04/14/2023    CREATININE 1.12 04/14/2023    CREATININE 1.03 04/12/2022    CREATININE 0.83 04/01/2021    EGFRIFNONA 102 04/01/2021    EGFRIFAFRI 123 04/01/2021       Lab Results   Component Value Date    GLUCOSE 102 (H) 04/14/2023    HGBA1C 5.10 04/14/2023    HGBA1C 5.3 04/12/2022    HGBA1C 5.59 04/01/2021    TSH 3.500 04/22/2024    FREET4 1.22 04/22/2024       Lab Results   Component Value Date    PSA 0.409 04/14/2023    PSA 0.5 04/12/2022    PSA 0.404 04/01/2021       No results found for: \"TESTOSTERONE\", \"TESTOSTEROTT\", \"TESTFRE\"    Lab Results   Component Value Date     (H) 04/14/2023    HDL 45 04/14/2023    TRIG 98 04/14/2023    CHOLHDLRATIO 3.69 04/14/2023       No components found for: \"VLGL959E\"    Lab Results   Component Value Date    WBC 4.03 04/14/2023    HGB 14.4 04/14/2023    MCV 90.8 04/14/2023     04/14/2023       Lab Results   Component Value Date    PROTEIN Trace (A) 04/14/2023    GLUCOSEU Negative 04/14/2023    BLOODU Negative 04/14/2023    NITRITEU Negative 04/14/2023    LEUKOCYTESUR Negative 04/14/2023          Lab Results   Component Value Date    HEPCVIRUSABY <0.1 04/01/2021       Imaging:                   Medical Tests:                 Summary of old records / correspondence / consultant report:               Request outside records:         ASSESSMENT & PLAN     ANNUAL WELLNESS EXAM / PHYSICAL     Other medical problems addressed today:  Problem List Items Addressed This Visit          High    Hypothyroidism (Chronic)    Overview     - TPO antibody negative    Continue levothyroxine 75 mcg qam.          Relevant Medications    levothyroxine (SYNTHROID, LEVOTHROID) 75 MCG tablet       Low    Tinea pedis of left foot    Current Assessment & Plan     Continue Lamisil 1% cream (refilled)         Relevant Medications    terbinafine (lamiSIL) 250 MG tablet    terbinafine (lamISIL) 1 % cream    Onychomycosis of left great toe    Current Assessment & Plan     " Was not able to get terbinafine previously at pharmacy.   Resent prescription to pharmacy.          Relevant Medications    terbinafine (lamiSIL) 250 MG tablet    terbinafine (lamISIL) 1 % cream     Other Visit Diagnoses       Encounter for annual health examination    -  Primary    Screening for colon cancer        Relevant Orders    Ambulatory Referral For Screening Colonoscopy            Summary/Discussion:         Next Appointment with me: Visit date not found    Return in about 6 months (around 11/1/2024) for Reassess chronic medical problems.      HEALTHCARE MAINTENANCE ISSUES       Cancer Screening:  Colon: Initial/Next screening at age: DUE NOW and ORDERED COLONOSCOPY  Repeat colon cancer screening: N/A at this time  Prostate: Performed today and recommended annual screening  Testicular: Recommended monthly self exam  Skin: Monthly self skin examination, annual exam by health professional  Lung: Does not meet criteria for lung cancer screening.   Other:    Screening Labs & Tests:  Lab results reviewed & discussed with with patient or orders placed today.  EKG:  CV Screening: Lipid panel  DEXA (75+ or risk factors):   HEP C (If born [id number] or risk factors): Previously had negative screen  Other:     Immunization/Vaccinations (to be given today unless deferred by patient)  Influenza: Recommended annual influenza vaccine  Hepatitis A: Recommended here or at pharmacy  Hepatitis B: Recommended here or at pharmacy  Tetanus/Pertussis: Up to date  Pneumococcal: Not needed at this time  Shingles: Not needed at this time  COVID: Already had the latest booster   RSV: Not indicated  Lifestyle Counseling:  Lifestyle Modifications: Attempt to lose weight, Maintain a low sugar/carbohydrate diet, Follow a low fat, low cholesterol diet, and Make dinner the lightest meal of day  Safety Issues: Always wear seatbelt, Avoid texting while driving   Use sunscreen, regular skin examination  Recommended annual dental/vision  examination.  Emotional/Stress/Sleep: Reviewed and  given when appropriate      Health Maintenance   Topic Date Due    COLORECTAL CANCER SCREENING  Never done    PROSTATE CANCER SCREENING  04/14/2024    LIPID PANEL  04/14/2024    ANNUAL PHYSICAL  04/21/2024    INFLUENZA VACCINE  08/01/2024    TDAP/TD VACCINES (2 - Td or Tdap) 09/11/2029    HEPATITIS C SCREENING  Completed    COVID-19 Vaccine  Completed    Pneumococcal Vaccine 0-64  Aged Out

## 2024-07-25 ENCOUNTER — DOCUMENTATION (OUTPATIENT)
Dept: INTERNAL MEDICINE | Age: 44
End: 2024-07-25
Payer: COMMERCIAL

## 2024-07-25 ENCOUNTER — PRIOR AUTHORIZATION (OUTPATIENT)
Dept: INTERNAL MEDICINE | Age: 44
End: 2024-07-25
Payer: COMMERCIAL

## 2024-07-25 DIAGNOSIS — B35.1 ONYCHOMYCOSIS OF LEFT GREAT TOE: ICD-10-CM

## 2024-07-25 DIAGNOSIS — B35.3 TINEA PEDIS OF LEFT FOOT: ICD-10-CM

## 2024-07-25 NOTE — TELEPHONE ENCOUNTER
Outcome  Approved today for Terbinafine  Your PA request has been approved. Additional information will be provided in the approval communication. (Message 114)  Authorization Expiration Date: 10/23/2024

## 2024-08-16 RX ORDER — TERBINAFINE HYDROCHLORIDE 250 MG/1
250 TABLET ORAL DAILY
Qty: 30 TABLET | Refills: 2 | Status: SHIPPED | OUTPATIENT
Start: 2024-08-16

## 2024-10-08 ENCOUNTER — TELEPHONE (OUTPATIENT)
Dept: INTERNAL MEDICINE | Age: 44
End: 2024-10-08
Payer: COMMERCIAL

## 2024-12-08 DIAGNOSIS — E03.9 HYPOTHYROIDISM, UNSPECIFIED TYPE: Chronic | ICD-10-CM

## 2024-12-11 ENCOUNTER — PRIOR AUTHORIZATION (OUTPATIENT)
Dept: INTERNAL MEDICINE | Age: 44
End: 2024-12-11
Payer: COMMERCIAL

## 2024-12-11 RX ORDER — LEVOTHYROXINE SODIUM 75 UG/1
TABLET ORAL
Qty: 90 TABLET | Refills: 1 | Status: SHIPPED | OUTPATIENT
Start: 2024-12-11

## 2024-12-11 NOTE — TELEPHONE ENCOUNTER
Terbinafine HCl 250MG tablets    [facility]         [facility]  Your PA request has been approved. Additional information will be provided in the approval communication. (Message 1142)  Authorization Expiration Date: 3/11/2025

## 2025-04-22 DIAGNOSIS — Z00.00 PREVENTATIVE HEALTH CARE: Primary | ICD-10-CM

## 2025-04-22 DIAGNOSIS — Z12.5 ENCOUNTER FOR SCREENING FOR MALIGNANT NEOPLASM OF PROSTATE: ICD-10-CM

## 2025-05-21 ENCOUNTER — OFFICE VISIT (OUTPATIENT)
Dept: INTERNAL MEDICINE | Age: 45
End: 2025-05-21
Payer: COMMERCIAL

## 2025-05-21 VITALS
DIASTOLIC BLOOD PRESSURE: 76 MMHG | HEART RATE: 69 BPM | TEMPERATURE: 96.9 F | HEIGHT: 69 IN | SYSTOLIC BLOOD PRESSURE: 100 MMHG | OXYGEN SATURATION: 99 % | BODY MASS INDEX: 22.96 KG/M2 | WEIGHT: 155 LBS

## 2025-05-21 DIAGNOSIS — B35.1 ONYCHOMYCOSIS OF LEFT GREAT TOE: ICD-10-CM

## 2025-05-21 DIAGNOSIS — Z00.00 ENCOUNTER FOR ANNUAL HEALTH EXAMINATION: Primary | ICD-10-CM

## 2025-05-21 DIAGNOSIS — E78.5 DYSLIPIDEMIA (HIGH LDL; LOW HDL): Chronic | ICD-10-CM

## 2025-05-21 DIAGNOSIS — Z12.11 SCREENING FOR COLON CANCER: ICD-10-CM

## 2025-05-21 DIAGNOSIS — Z13.6 SCREENING FOR ISCHEMIC HEART DISEASE: ICD-10-CM

## 2025-05-21 DIAGNOSIS — R55 VASOVAGAL SYNDROME: Chronic | ICD-10-CM

## 2025-05-21 DIAGNOSIS — E03.9 HYPOTHYROIDISM, UNSPECIFIED TYPE: Chronic | ICD-10-CM

## 2025-05-21 PROBLEM — L85.3 DRY SKIN DERMATITIS: Status: RESOLVED | Noted: 2021-04-08 | Resolved: 2025-05-21

## 2025-05-21 RX ORDER — TERBINAFINE HYDROCHLORIDE 250 MG/1
250 TABLET ORAL DAILY
Qty: 30 TABLET | Refills: 2 | Status: SHIPPED | OUTPATIENT
Start: 2025-05-21

## 2025-05-21 NOTE — ASSESSMENT & PLAN NOTE
Lab Results   Component Value Date     (H) 05/15/2025     (H) 05/01/2024     (H) 04/14/2023     Lab Results   Component Value Date    HDL 41 05/15/2025    HDL 41 05/01/2024    HDL 45 04/14/2023     Lab Results   Component Value Date    TRIG 68 05/15/2025      Maintain low saturated fat/cholesterol diet.    Strongly consider statin therapy for primary prevention (discussed).   Will await CT coronary calcium score test findings first (ordered).     Plan to check NMR Lipoprofile and LP(a) in 6 months prior to follow-up (ordered). Sooner if we do start statin therapy.

## 2025-05-21 NOTE — ASSESSMENT & PLAN NOTE
Discussed pathophysiology, triggers, prevention strategy.   Avoid triggers when possible. Notify health care providers prior to any invasive tests/procedures.   Maintain good hydration with water at all times.   Increase dietary salt intake for volume expansion as his blood pressure is consistently low.     He expressed understanding and agreed to follow above instructions.

## 2025-05-21 NOTE — ASSESSMENT & PLAN NOTE
About 50% better after initial treatment last year. Interested in 2nd round of medication.   Lamisil 250 mg daily for 12 more weeks.

## 2025-05-21 NOTE — PROGRESS NOTES
"                                          J  U  N  O  H    K  I  M ,   M  D                                I  N  T  E  R  N  A  L    M  E  D  I  C  I  N  E         ENCOUNTER DATE:  05/21/2025    Brayan Martins / 45 y.o. / male    ANNUAL PREVENTATIVE / PHYSICAL ENCOUNTER       CHIEF COMPLAINT     Annual Exam (5/1/24), Hyperlipidemia, Onychomycosis (persistent), Hypothyroidism, and Vasovagal episodes      VITALS     Vitals:    05/21/25 1507   BP: 100/76   Pulse: 69   Temp: 96.9 °F (36.1 °C)   SpO2: 99%   Weight: 70.3 kg (155 lb)   Height: 175.3 cm (69\")       BP Readings from Last 3 Encounters:   05/21/25 100/76   05/01/24 100/80   02/28/24 122/96     Wt Readings from Last 3 Encounters:   05/21/25 70.3 kg (155 lb)   05/01/24 68.9 kg (152 lb)   02/28/24 70.3 kg (155 lb)      Body mass index is 22.89 kg/m².    Blood pressure readings recorded on patient flowsheet:       No data to display                MEDICATIONS     Current Outpatient Medications on File Prior to Visit   Medication Sig Dispense Refill    Cholecalciferol (Vitamin D) 50 MCG (2000 UT) capsule Take  by mouth. OTC      levothyroxine (SYNTHROID, LEVOTHROID) 75 MCG tablet TAKE 1 TABLET BY MOUTH DAILY IN THE MORNING ON AN EMPTY STOMACH AND WITH WATER AS DIRECTED 90 tablet 1    terbinafine (lamISIL) 1 % cream Apply 1 Application topically to the appropriate area as directed 2 (Two) Times a Day. 42 g 1    [DISCONTINUED] terbinafine (lamiSIL) 250 MG tablet Take 1 tablet by mouth Daily. 30 tablet 2     No current facility-administered medications on file prior to visit.         HISTORY OF PRESENT ILLNESS      Brayan presents for annual health maintenance visit.    He is also here to follow-up on active medical problems requiring laboratory evaluation and/or medical management.      He is on levothyroxine 75 mcg for hypothyroidism that his TPO antibody negative.  He has history of dyslipidemia with high LDL and low HDL with concerning family history of early coronary " artery disease including his father who had bypass surgery at the age of 50.  Patient states that he went to [facility] not long ago and underwent some heart testing including treadmill stress test which was negative for ischemia along with negative EKG.  He has never taken statin therapy but is open to taking it if necessary.  He has history of vasovagal syndrome triggered by unpleasant visual stimulation as well as during lab draws.  He tends to have chronically low running blood pressure.  He denies excessive lightheadedness or dizziness.  He denies excessive fatigue.  He reports to eating a vegan diet.  He was previously treated with terbinafine 250 mg daily for 12 weeks with partial resolution of onychomycosis.  Tinea pedis symptoms have resolved however he has ongoing fungal issues of his nail and he is interested in pursuing another round of treatment.    Patient Care Team:  Kyrie Ayala MD as PCP - General (Internal Medicine)  Yogesh Keen MD as Consulting Physician (Urology)  eye dr (Optometry)    General health: some medical problems  Lifestyle:  Attempting to lose weight?: Yes   Diet: eats moderately healthy and is vegan  Exercise: exercises 7 days weekly  Tobacco: Never used  Alcohol: does not drink  Work: Full-time  Reproductive health:  Sexually active?: Yes   Concern for STD?: No   Sexual problems?: No problems   Sees Urologist?: No   Depression Screening:      PHQ-2 Depression Screening  Little interest or pleasure in doing things? Not at all   Feeling down, depressed, or hopeless? Not at all   PHQ-2 Total Score 0              5/21/2025     3:09 PM   PHQ-2/PHQ-9 Depression Screening   Little interest or pleasure in doing things Not at all   Feeling down, depressed, or hopeless Not at all   How difficult have these problems made it for you to do your work, take care of things at home, or get along with other people? Not difficult at all        PHQ-2: 0 (Not depressed)     PHQ-9: 0 (Negative  screening for depression)    ______________________________________________________________________    ALLERGIES  No Known Allergies     PFSH:     The following portions of the patient's history were reviewed and updated as appropriate: Allergies / Current Medications / Past Medical History / Surgical History / Social History / Family History    PROBLEM LIST   Patient Active Problem List   Diagnosis    Hypothyroidism    Vitamin D deficiency    Dyslipidemia (high LDL; low HDL)    Tinea pedis of left foot    Onychomycosis of left great toe    Tension headache    Vasovagal syndrome       PAST MEDICAL HISTORY  Past Medical History:   Diagnosis Date    Hypothyroidism     Impaired glucose metabolism     Kidney stone     passed    Vitamin D deficiency        SURGICAL HISTORY  Past Surgical History:   Procedure Laterality Date    CIRCUMCISION  2009    CYSTOSCOPY W/ LASER LITHOTRIPSY Right 2021       SOCIAL HISTORY  Social History     Socioeconomic History    Marital status:      Spouse name: Mikki    Number of children: 2   Tobacco Use    Smoking status: Never    Smokeless tobacco: Never   Vaping Use    Vaping status: Never Used   Substance and Sexual Activity    Alcohol use: Not Currently     Comment: occasional    Drug use: No    Sexual activity: Yes     Partners: Female     Birth control/protection: Tubal ligation       FAMILY HISTORY  Family History   Problem Relation Age of Onset    No Known Problems Mother     Hypertension Father     Coronary artery disease Father 50        s/p cabg    Hyperlipidemia Father     No Known Problems Sister     No Known Problems Paternal Grandmother          82    Hypertension Paternal Grandfather          85    No Known Problems Daughter     No Known Problems Son     Diabetes type II Maternal Uncle     Coronary artery disease Maternal Uncle         cabg    Coronary artery disease Paternal Aunt 68    Coronary artery disease Paternal Uncle 68    Diabetes Neg Hx      Thyroid disease Neg Hx     Cancer Neg Hx     Colon polyps Neg Hx     Prostate cancer Neg Hx     Nephrolithiasis Neg Hx        IMMUNIZATION HISTORY  Immunization History   Administered Date(s) Administered    COVID-19 (PFIZER) 12YRS+ (COMIRNATY) 12/30/2023    COVID-19 (PFIZER) BIVALENT 12+YRS 11/04/2022    COVID-19 (PFIZER) Purple Cap Monovalent 03/16/2021, 04/05/2021, 11/16/2021    Flu Vaccine Split Quad 10/07/2020    Fluzone (or Fluarix & Flulaval for VFC) >6mos 10/07/2020    Influenza Injectable Mdck Pf Quad 11/04/2022    Influenza, Unspecified 02/24/2019    MMR 06/24/2021    Tdap 09/11/2019         REVIEW OF SYSTEMS     Review of Systems   Constitutional: Negative.    HENT: Negative.     Eyes: Negative.    Respiratory: Negative.     Cardiovascular: Negative.  Negative for chest pain and palpitations.        Vasovagal episodes   Gastrointestinal: Negative.    Endocrine: Negative.    Genitourinary: Negative.    Musculoskeletal: Negative.    Skin: Negative.    Allergic/Immunologic: Negative.    Neurological:  Positive for syncope (vasovagal episodes).   Hematological: Negative.    Psychiatric/Behavioral: Negative.           PHYSICAL EXAMINATION     Physical Exam  Constitutional:       Appearance: Normal appearance.   HENT:      Head: Normocephalic.      Right Ear: Tympanic membrane, ear canal and external ear normal.      Left Ear: Tympanic membrane, ear canal and external ear normal.      Mouth/Throat:      Mouth: Mucous membranes are moist.      Pharynx: Oropharynx is clear.   Eyes:      General: No scleral icterus.     Conjunctiva/sclera: Conjunctivae normal.      Pupils: Pupils are equal, round, and reactive to light.   Neck:      Thyroid: No thyromegaly.      Vascular: No carotid bruit.      Trachea: No tracheal deviation.   Cardiovascular:      Rate and Rhythm: Normal rate and regular rhythm.      Pulses: Normal pulses.      Heart sounds: Normal heart sounds. No murmur heard.     No friction rub. No gallop.  "     Comments: No carotid bruits  Pulmonary:      Effort: Pulmonary effort is normal.      Breath sounds: Normal breath sounds.   Abdominal:      General: There is no distension.      Palpations: Abdomen is soft. There is no mass.      Tenderness: There is no abdominal tenderness.      Hernia: No hernia is present.   Musculoskeletal:      Cervical back: Neck supple.      Right lower leg: No edema.      Left lower leg: No edema.   Lymphadenopathy:      Cervical: No cervical adenopathy.      Upper Body:      Right upper body: No supraclavicular adenopathy.      Left upper body: No supraclavicular adenopathy.   Skin:     Coloration: Skin is not jaundiced or pale.      Findings: No erythema, lesion (Negative for suspicious skin lesions/growths) or rash.      Nails: There is no clubbing.      Comments: No suspicious skin lesions.    Neurological:      General: No focal deficit present.      Mental Status: He is alert and oriented to person, place, and time. Mental status is at baseline.      Cranial Nerves: No cranial nerve deficit.      Motor: No abnormal muscle tone.   Psychiatric:         Mood and Affect: Mood normal.         Behavior: Behavior normal.         Thought Content: Thought content normal.         Judgment: Judgment normal.         REVIEWED DATA      Labs:    Lab Results   Component Value Date     05/15/2025    K 4.5 05/15/2025    CALCIUM 9.8 05/15/2025    AST 35 05/15/2025    ALT 38 05/15/2025    BUN 13 05/15/2025    CREATININE 1.02 05/15/2025    CREATININE 0.88 05/01/2024    CREATININE 1.12 04/14/2023    EGFR 91.2 05/15/2025     Lab Results   Component Value Date    GLUCOSE 79 05/15/2025    HGBA1C 5.40 05/15/2025    HGBA1C 5.40 05/01/2024    HGBA1C 5.10 04/14/2023    TSH 2.550 05/15/2025    FREET4 1.31 05/15/2025     Lab Results   Component Value Date    PSA 0.354 05/15/2025    PSA 0.406 05/01/2024    PSA 0.409 04/14/2023     No results found for: \"TESTOSTERONE\", \"TESTOSTEROTT\", \"TESTFRE\"  Lab " Results   Component Value Date     (H) 05/15/2025    HDL 41 05/15/2025    TRIG 68 05/15/2025    CHOLHDLRATIO 4.02 05/15/2025     Lab Results   Component Value Date    WBC 3.48 05/15/2025    HGB 13.4 05/15/2025    MCV 91.0 05/15/2025     05/15/2025     Lab Results   Component Value Date    PROTEIN Negative 05/15/2025    GLUCOSEU Negative 05/15/2025    BLOODU Negative 05/15/2025    NITRITEU Negative 05/15/2025    LEUKOCYTESUR Negative 05/15/2025     Lab Results   Component Value Date    HEPCVIRUSABY <0.1 04/01/2021       Imaging:                   Medical Tests:                 Summary of old records / correspondence / consultant report:               Request outside records:         ASSESSMENT & PLAN     ANNUAL WELLNESS EXAM / PHYSICAL     Other medical problems addressed today:  Problem List Items Addressed This Visit          High    Dyslipidemia (high LDL; low HDL) (Chronic)    Current Assessment & Plan   Lab Results   Component Value Date     (H) 05/15/2025     (H) 05/01/2024     (H) 04/14/2023     Lab Results   Component Value Date    HDL 41 05/15/2025    HDL 41 05/01/2024    HDL 45 04/14/2023     Lab Results   Component Value Date    TRIG 68 05/15/2025      Maintain low saturated fat/cholesterol diet.    Strongly consider statin therapy for primary prevention (discussed).   Will await CT coronary calcium score test findings first (ordered).     Plan to check NMR Lipoprofile and LP(a) in 6 months prior to follow-up (ordered). Sooner if we do start statin therapy.          Relevant Orders    Comprehensive Metabolic Panel    NMR LipoProfile    Lipoprotein A (LPA)       Medium    Hypothyroidism (Chronic)    Overview   TPO antibody negative    Continue levothyroxine 75 mcg qam         Relevant Medications    levothyroxine (SYNTHROID, LEVOTHROID) 75 MCG tablet    Vasovagal syndrome (Chronic)    Current Assessment & Plan   Discussed pathophysiology, triggers, prevention strategy.    Avoid triggers when possible. Notify health care providers prior to any invasive tests/procedures.   Maintain good hydration with water at all times.   Increase dietary salt intake for volume expansion as his blood pressure is consistently low.     He expressed understanding and agreed to follow above instructions.             Low    Onychomycosis of left great toe    Current Assessment & Plan   About 50% better after initial treatment last year. Interested in 2nd round of medication.   Lamisil 250 mg daily for 12 more weeks.          Relevant Medications    terbinafine (lamISIL) 1 % cream    terbinafine (lamiSIL) 250 MG tablet     Other Visit Diagnoses         Encounter for annual health examination    -  Primary      Screening for colon cancer        Relevant Orders    Ambulatory Referral For Screening Colonoscopy      Screening for ischemic heart disease        Relevant Orders    CT Cardiac Calcium Score Without Dye            Orders Placed This Encounter   Procedures    CT Cardiac Calcium Score Without Dye     Release to patient:   Routine Release [[id number]]     Reason for Exam::   Screen for CAD    Comprehensive Metabolic Panel     Standing Status:   Future     Expected Date:   10/21/2025     Expiration Date:   8/21/2026     Release to patient:   Routine Release [[id number]]    NMR LipoProfile     Standing Status:   Future     Expected Date:   10/21/2025     Expiration Date:   8/21/2026     Release to patient:   Routine Release [[id number]]    Lipoprotein A (LPA)     Standing Status:   Future     Expected Date:   10/21/2025     Expiration Date:   8/21/2026     Release to patient:   Routine Release [[id number]]    Ambulatory Referral For Screening Colonoscopy     Referral Priority:   Routine     Referral Type:   Diagnostic Medical     Referral Reason:   Specialty Services Required     Number of Visits Requested:   1        Summary/Discussion:         Next Appointment with me: Visit date not found    Return  in about 6 months (around 11/21/2025) for Reassess chronic medical problems, FASTING LABS 1 WEEK PRIOR TO FOLLOWUP.      HEALTHCARE MAINTENANCE ISSUES     Health Maintenance   Topic Date Due    COLORECTAL CANCER SCREENING  Never done    ANNUAL PHYSICAL  05/01/2025    INFLUENZA VACCINE  07/01/2025    PROSTATE CANCER SCREENING  05/15/2026    LIPID PANEL  05/15/2026    TDAP/TD VACCINES (2 - Td or Tdap) 09/11/2029    HEPATITIS C SCREENING  Completed    Pneumococcal Vaccine 0-49  Aged Out    COVID-19 Vaccine  Discontinued       Cancer Screening:  Colon: Initial/Next screening at age: OVERDUE and ORDERED COLONOSCOPY  Repeat colon cancer screening: N/A at this time  Prostate: Performed today and recommended annual screening  Lung: Does not meet criteria for lung cancer screening.   Testicular: Recommended monthly self exam  Skin: Monthly self skin examination, annual exam by health professional  Other:    Miscellaneous Screening:  CV Screening: Lipid panel and Ischemic heart disease (Cardiac CT calcium score) recommended/ordered  DEXA (75+ or risk factors):   Other:     Immunization/Vaccinations:    Immunization History   Administered Date(s) Administered    COVID-19 (PFIZER) 12YRS+ (COMIRNATY) 12/30/2023    COVID-19 (PFIZER) BIVALENT 12+YRS 11/04/2022    COVID-19 (PFIZER) Purple Cap Monovalent 03/16/2021, 04/05/2021, 11/16/2021    Flu Vaccine Split Quad 10/07/2020    Fluzone (or Fluarix & Flulaval for VFC) >6mos 10/07/2020    Influenza Injectable Mdck Pf Quad 11/04/2022    Influenza, Unspecified 02/24/2019    MMR 06/24/2021    Tdap 09/11/2019        Influenza: Recommended annual influenza vaccine  Pneumococcal: Not needed at this time  COVID: Does not plan to get the latest booster  RSV: Not indicated  Tetanus/Pertussis: Up to date  Hepatitis A: Recommended here or at pharmacy  Hepatitis B: Recommended here or at pharmacy  Shingles: Not needed at this time  HPV: N/A    Lifestyle Counseling:  Lifestyle Modifications:  Continue good lifestyle choices/modifications, Maintain a low sugar/carbohydrate diet, Follow a low fat, low cholesterol diet, Make dinner the lightest meal of day, and Discussed sexual issues, safe sex practices, contraception  Safety Issues: Always wear seatbelt, Avoid texting while driving   Use sunscreen, regular skin examination  Recommended annual dental/vision examination.  Emotional/Stress/Sleep: Reviewed and  given when appropriate

## 2025-05-23 ENCOUNTER — PRIOR AUTHORIZATION (OUTPATIENT)
Dept: INTERNAL MEDICINE | Age: 45
End: 2025-05-23
Payer: COMMERCIAL

## 2025-05-23 NOTE — TELEPHONE ENCOUNTER
Terbinafine HCl 250MG tablets      [facility]          Appro[facility] today [facility]  Your PA request has been approved. Additional information will be provided in the approval communication. (Message 1142)  Effective Date: 5/23/2025  Authorization Expiration Date: 8/21/2025

## 2025-07-08 ENCOUNTER — HOSPITAL ENCOUNTER (OUTPATIENT)
Dept: CT IMAGING | Facility: HOSPITAL | Age: 45
Discharge: HOME | End: 2025-07-08
Admitting: INTERNAL MEDICINE

## 2025-07-08 PROCEDURE — 75571 CT HRT W/O DYE W/CA TEST: CPT
